# Patient Record
Sex: MALE | Race: OTHER | HISPANIC OR LATINO
[De-identification: names, ages, dates, MRNs, and addresses within clinical notes are randomized per-mention and may not be internally consistent; named-entity substitution may affect disease eponyms.]

---

## 2019-01-24 ENCOUNTER — FORM ENCOUNTER (OUTPATIENT)
Age: 64
End: 2019-01-24

## 2019-01-25 ENCOUNTER — OUTPATIENT (OUTPATIENT)
Dept: OUTPATIENT SERVICES | Facility: HOSPITAL | Age: 64
LOS: 1 days | End: 2019-01-25
Payer: COMMERCIAL

## 2019-01-25 ENCOUNTER — APPOINTMENT (OUTPATIENT)
Dept: ORTHOPEDIC SURGERY | Facility: CLINIC | Age: 64
End: 2019-01-25
Payer: COMMERCIAL

## 2019-01-25 VITALS — BODY MASS INDEX: 35.12 KG/M2 | WEIGHT: 265 LBS | HEIGHT: 73 IN

## 2019-01-25 PROCEDURE — 20610 DRAIN/INJ JOINT/BURSA W/O US: CPT | Mod: RT

## 2019-01-25 PROCEDURE — 73562 X-RAY EXAM OF KNEE 3: CPT

## 2019-01-25 PROCEDURE — 99203 OFFICE O/P NEW LOW 30 MIN: CPT | Mod: 25

## 2019-01-25 PROCEDURE — 73562 X-RAY EXAM OF KNEE 3: CPT | Mod: 26,RT

## 2019-01-25 NOTE — PROCEDURE
[de-identified] : After obtaining verbal consent and under the normal sterile precautions the right knee was injected today via the lateral patellar portal with a solution of 4cc's of 1% lidocaine and 1cc of 40mg/mL of kenalog.

## 2019-01-25 NOTE — ASSESSMENT
[FreeTextEntry1] : Assessment:\par 1. Right knee arthritis severe\par 2. Left knee arthritis moderate\par \par Plan:\par #1 Meloxicam 7.5 mg, 1 tablet PO daily\par #2 Right knee corticosteroid injection \par #3 Needs work note for rest and to return Thursday\par #4 Needs to follow-up in 2 weeks to re-evaluate the pt.'s pain and discuss continued treatment and/or surgical knee replacement options.

## 2019-01-25 NOTE — PHYSICAL EXAM
[de-identified] : Focused exam of bilateral knees:\par \par Right knee: Pt. has no redness, erythema, swelling, or drainage. Has neutral alignment, ROM: -10-90.  Ligamentously Stable. \par \par Left knee: Pt. has no redness, erythema, swelling, or drainage. Has neutral alignment, ROM: 0-100. [de-identified] : Pt. has bilateral medial compartment joint space narrowing, right is worse than left. Right knee is bone on bone. Subchondral cysts and osteophytes.  Pt. has bilateral varus alignment, right is worse than left.

## 2019-03-20 ENCOUNTER — APPOINTMENT (OUTPATIENT)
Dept: ORTHOPEDIC SURGERY | Facility: CLINIC | Age: 64
End: 2019-03-20
Payer: COMMERCIAL

## 2019-03-20 PROCEDURE — 99213 OFFICE O/P EST LOW 20 MIN: CPT

## 2019-03-20 NOTE — ASSESSMENT
[FreeTextEntry1] : Assessment\par #1 severe right knee arthritis\par #2 moderate left knee arthritis\par \par Plan\par #1 explained that a repeat injection would not only be unsafe and less than 90 days but can actually make his arthritis worse, we did not perform this today.\par #2 gave him a 2 week supply of Percocet for pain relief.\par #3 discussed with patient he will eventually need a total knee arthroplasty, explained the risks benefits alternatives thereof, he will go home and talk to his family and think about this over the next 2 weeks and follow up with us in the office.

## 2019-03-20 NOTE — HISTORY OF PRESENT ILLNESS
[de-identified] : 63-year-old male with bone-on-bone arthritis of the medial compartment of the right knee following up today for increasing pain after an injection back in January. His pain is 10/10 worse with weightbearing and ambulatory activities. It is also worse at bedtime. No other aggravating or alleviating factors. He is requesting a repeat injection or some other sort of pain relieving modalities today.

## 2019-03-20 NOTE — PHYSICAL EXAM
[de-identified] : Focused exam of bilateral knees:\par \par Right knee: Pt. has no redness, erythema, swelling, or drainage. Has neutral alignment, ROM: -10-90.  Ligamentously Stable. \par \par Left knee: Pt. has no redness, erythema, swelling, or drainage. Has neutral alignment, ROM: 0-100.

## 2019-04-10 ENCOUNTER — APPOINTMENT (OUTPATIENT)
Dept: ORTHOPEDIC SURGERY | Facility: CLINIC | Age: 64
End: 2019-04-10
Payer: COMMERCIAL

## 2019-04-10 PROCEDURE — 99213 OFFICE O/P EST LOW 20 MIN: CPT

## 2019-04-10 NOTE — ASSESSMENT
[FreeTextEntry1] : Assessment\par #1 severe right knee arthritis\par \par Plan\par #1 is to proceed with a right total knee arthroplasty, he will undergo the normal preoperative clearance pathway and this will be scheduled for the earliest mutual convenience. Risks benefits alternatives explained and clear detail and the patient wishes to proceed. He will get work note for today and tomorrow. We will fill out his [facility] paperwork after he takes a surgical date. No other complaints at this time.

## 2019-04-10 NOTE — PHYSICAL EXAM
[de-identified] : Focused exam of bilateral knees:\par \par Right knee: Pt. has no redness, erythema, swelling, or drainage. Has neutral alignment, ROM: -10-90. Ligamentously Stable. \par \par Left knee: Pt. has no redness, erythema, swelling, or drainage. Has neutral alignment, ROM: 0-100. \par  [de-identified] : X-rays of the right knee show bone-on-bone contact in the medial compartment, there is decreased joint space in the lateral and patellofemoral compartment, there are peripheral osteophytes and subchondral sclerosis and also.

## 2019-04-27 ENCOUNTER — OUTPATIENT (OUTPATIENT)
Dept: OUTPATIENT SERVICES | Facility: HOSPITAL | Age: 64
LOS: 1 days | End: 2019-04-27
Payer: COMMERCIAL

## 2019-04-27 DIAGNOSIS — Z01.818 ENCOUNTER FOR OTHER PREPROCEDURAL EXAMINATION: ICD-10-CM

## 2019-04-27 LAB
ALBUMIN SERPL ELPH-MCNC: 4.1 G/DL — SIGNIFICANT CHANGE UP (ref 3.3–5)
ALP SERPL-CCNC: 82 U/L — SIGNIFICANT CHANGE UP (ref 40–120)
ALT FLD-CCNC: 17 U/L — SIGNIFICANT CHANGE UP (ref 10–45)
ANION GAP SERPL CALC-SCNC: 9 MMOL/L — SIGNIFICANT CHANGE UP (ref 5–17)
APPEARANCE UR: CLEAR — SIGNIFICANT CHANGE UP
APTT BLD: 31.3 SEC — SIGNIFICANT CHANGE UP (ref 27.5–36.3)
AST SERPL-CCNC: 14 U/L — SIGNIFICANT CHANGE UP (ref 10–40)
BACTERIA # UR AUTO: PRESENT /HPF
BILIRUB SERPL-MCNC: 0.4 MG/DL — SIGNIFICANT CHANGE UP (ref 0.2–1.2)
BILIRUB UR-MCNC: NEGATIVE — SIGNIFICANT CHANGE UP
BUN SERPL-MCNC: 19 MG/DL — SIGNIFICANT CHANGE UP (ref 7–23)
CALCIUM SERPL-MCNC: 9.5 MG/DL — SIGNIFICANT CHANGE UP (ref 8.4–10.5)
CHLORIDE SERPL-SCNC: 104 MMOL/L — SIGNIFICANT CHANGE UP (ref 96–108)
CO2 SERPL-SCNC: 31 MMOL/L — SIGNIFICANT CHANGE UP (ref 22–31)
COLOR SPEC: YELLOW — SIGNIFICANT CHANGE UP
CREAT SERPL-MCNC: 1.25 MG/DL — SIGNIFICANT CHANGE UP (ref 0.5–1.3)
DIFF PNL FLD: NEGATIVE — SIGNIFICANT CHANGE UP
EPI CELLS # UR: ABNORMAL /HPF (ref 0–5)
GLUCOSE SERPL-MCNC: 109 MG/DL — HIGH (ref 70–99)
GLUCOSE UR QL: NEGATIVE — SIGNIFICANT CHANGE UP
HCT VFR BLD CALC: 44.7 % — SIGNIFICANT CHANGE UP (ref 39–50)
HGB BLD-MCNC: 14 G/DL — SIGNIFICANT CHANGE UP (ref 13–17)
INR BLD: 1.06 — SIGNIFICANT CHANGE UP (ref 0.88–1.16)
KETONES UR-MCNC: NEGATIVE — SIGNIFICANT CHANGE UP
LEUKOCYTE ESTERASE UR-ACNC: NEGATIVE — SIGNIFICANT CHANGE UP
MCHC RBC-ENTMCNC: 30.2 PG — SIGNIFICANT CHANGE UP (ref 27–34)
MCHC RBC-ENTMCNC: 31.3 GM/DL — LOW (ref 32–36)
MCV RBC AUTO: 96.3 FL — SIGNIFICANT CHANGE UP (ref 80–100)
NITRITE UR-MCNC: NEGATIVE — SIGNIFICANT CHANGE UP
NRBC # BLD: 0 /100 WBCS — SIGNIFICANT CHANGE UP (ref 0–0)
PH UR: 6 — SIGNIFICANT CHANGE UP (ref 5–8)
PLATELET # BLD AUTO: 263 K/UL — SIGNIFICANT CHANGE UP (ref 150–400)
POTASSIUM SERPL-MCNC: 5 MMOL/L — SIGNIFICANT CHANGE UP (ref 3.5–5.3)
POTASSIUM SERPL-SCNC: 5 MMOL/L — SIGNIFICANT CHANGE UP (ref 3.5–5.3)
PROT SERPL-MCNC: 6.7 G/DL — SIGNIFICANT CHANGE UP (ref 6–8.3)
PROT UR-MCNC: ABNORMAL MG/DL
PROTHROM AB SERPL-ACNC: 12 SEC — SIGNIFICANT CHANGE UP (ref 10–12.9)
RBC # BLD: 4.64 M/UL — SIGNIFICANT CHANGE UP (ref 4.2–5.8)
RBC # FLD: 14.8 % — HIGH (ref 10.3–14.5)
RBC CASTS # UR COMP ASSIST: < 5 /HPF — SIGNIFICANT CHANGE UP
SODIUM SERPL-SCNC: 144 MMOL/L — SIGNIFICANT CHANGE UP (ref 135–145)
SP GR SPEC: 1.01 — SIGNIFICANT CHANGE UP (ref 1–1.03)
UROBILINOGEN FLD QL: 0.2 E.U./DL — SIGNIFICANT CHANGE UP
WBC # BLD: 5.39 K/UL — SIGNIFICANT CHANGE UP (ref 3.8–10.5)
WBC # FLD AUTO: 5.39 K/UL — SIGNIFICANT CHANGE UP (ref 3.8–10.5)
WBC UR QL: < 5 /HPF — SIGNIFICANT CHANGE UP

## 2019-04-27 PROCEDURE — 93010 ELECTROCARDIOGRAM REPORT: CPT

## 2019-04-27 PROCEDURE — 85730 THROMBOPLASTIN TIME PARTIAL: CPT

## 2019-04-27 PROCEDURE — 85610 PROTHROMBIN TIME: CPT

## 2019-04-27 PROCEDURE — 85027 COMPLETE CBC AUTOMATED: CPT

## 2019-04-27 PROCEDURE — 80053 COMPREHEN METABOLIC PANEL: CPT

## 2019-04-27 PROCEDURE — 81001 URINALYSIS AUTO W/SCOPE: CPT

## 2019-04-27 PROCEDURE — 93005 ELECTROCARDIOGRAM TRACING: CPT

## 2019-05-07 ENCOUNTER — FORM ENCOUNTER (OUTPATIENT)
Age: 64
End: 2019-05-07

## 2019-05-08 ENCOUNTER — OUTPATIENT (OUTPATIENT)
Dept: OUTPATIENT SERVICES | Facility: HOSPITAL | Age: 64
LOS: 1 days | End: 2019-05-08
Payer: COMMERCIAL

## 2019-05-08 PROCEDURE — 71046 X-RAY EXAM CHEST 2 VIEWS: CPT

## 2019-05-08 PROCEDURE — 71046 X-RAY EXAM CHEST 2 VIEWS: CPT | Mod: 26

## 2019-05-10 ENCOUNTER — APPOINTMENT (OUTPATIENT)
Dept: HEART AND VASCULAR | Facility: CLINIC | Age: 64
End: 2019-05-10
Payer: COMMERCIAL

## 2019-05-10 ENCOUNTER — APPOINTMENT (OUTPATIENT)
Dept: HEART AND VASCULAR | Facility: CLINIC | Age: 64
End: 2019-05-10

## 2019-05-10 VITALS
SYSTOLIC BLOOD PRESSURE: 170 MMHG | OXYGEN SATURATION: 98 % | BODY MASS INDEX: 34.85 KG/M2 | DIASTOLIC BLOOD PRESSURE: 80 MMHG | WEIGHT: 263 LBS | RESPIRATION RATE: 16 BRPM | HEIGHT: 73 IN | TEMPERATURE: 98.3 F | HEART RATE: 76 BPM

## 2019-05-10 PROCEDURE — 93306 TTE W/DOPPLER COMPLETE: CPT

## 2019-05-10 PROCEDURE — 99205 OFFICE O/P NEW HI 60 MIN: CPT | Mod: 25

## 2019-05-10 PROCEDURE — 93880 EXTRACRANIAL BILAT STUDY: CPT

## 2019-05-16 ENCOUNTER — APPOINTMENT (OUTPATIENT)
Dept: ORTHOPEDIC SURGERY | Facility: HOSPITAL | Age: 64
End: 2019-05-16

## 2019-05-16 NOTE — DISCUSSION/SUMMARY
[Procedure Low Risk] : the procedure risk is low [Patient High Risk] : the patient is a high surgical risk [Additional Diagnostics Recommended] : additional diagnostics recommended [FreeTextEntry1] : echocardiogram shows concentric LVH with reduced LVEF  45% and moderate MR  with secondary pulmonary hypertension \par \par Will start amlodipine/benazepril 10/20 po qd     and Furosemide 40mg  three days a week\par follow up in one week  to assess response to therapy-   will need  pharmacologic nuclear stress to r/o significant obstructive CAD prior to  general surgery

## 2019-05-16 NOTE — PHYSICAL EXAM
[General Appearance - Well Developed] : well developed [Well Groomed] : well groomed [Normal Appearance] : normal appearance [No Deformities] : no deformities [General Appearance - In No Acute Distress] : no acute distress [Normal Conjunctiva] : the conjunctiva exhibited no abnormalities [No Oral Cyanosis] : no oral cyanosis [Eyelids - No Xanthelasma] : the eyelids demonstrated no xanthelasmas [Normal Jugular Venous V Waves Present] : normal jugular venous V waves present [Normal Jugular Venous A Waves Present] : normal jugular venous A waves present [No Jugular Venous Bowie A Waves] : no jugular venous bowie A waves [Exaggerated Use Of Accessory Muscles For Inspiration] : no accessory muscle use [Respiration, Rhythm And Depth] : normal respiratory rhythm and effort [Heart Sounds] : normal S1 and S2 [Auscultation Breath Sounds / Voice Sounds] : lungs were clear to auscultation bilaterally [Heart Rate And Rhythm] : heart rate and rhythm were normal [Murmurs] : no murmurs present [FreeTextEntry1] : limping  secondary to right knee pain  [Nail Clubbing] : no clubbing of the fingernails [Cyanosis, Localized] : no localized cyanosis [Petechial Hemorrhages (___cm)] : no petechial hemorrhages [Skin Color & Pigmentation] : normal skin color and pigmentation [Skin Turgor] : normal skin turgor [No Venous Stasis] : no venous stasis [Skin Lesions] : no skin lesions [] : no rash [No Skin Ulcers] : no skin ulcer [No Xanthoma] : no  xanthoma was observed [Mood] : the mood was normal [Affect] : the affect was normal [Oriented To Time, Place, And Person] : oriented to person, place, and time

## 2019-05-16 NOTE — HISTORY OF PRESENT ILLNESS
[Preoperative Visit] : for a medical evaluation prior to surgery [Scheduled Procedure ___] : a [unfilled] [Date of Surgery ___] : on [unfilled] [Surgeon Name ___] : surgeon: [unfilled] [Fever] : no fever [Stable] : Stable [Chills] : no chills [Chest Pain] : no chest pain [Fatigue] : no fatigue [Dyspnea] : dyspnea [Cough] : no cough [Nausea] : no nausea [Dysuria] : no dysuria [Urinary Frequency] : no urinary frequency [Abdominal Pain] : no abdominal pain [Diarrhea] : no diarrhea [Vomiting] : no vomiting [Lower Extremity Swelling] : lower extremity swelling [Easy Bruising] : no easy bruising [Diabetes] : no diabetes [Poor Exercise Tolerance] : poor exercise tolerance [Cardiovascular Disease] : cardiovascular disease [Dyspnea on Exertion] : difficulty breathing during exertion [Pulmonary Disease] : no pulmonary disease [Anti-Platelet Agents] : no anti-platelet agents [Nicotine Dependence] : no nicotine dependence [Alcohol Use] : no  alcohol use [Sleep Apnea] : no sleep apnea [Renal Disease] : no renal disease [GI Disease] : no gastrointestinal disease [Thromboembolic Problems] : no thromboembolic problems [Frequent use of NSAIDs] : no use of NSAIDs [Steroid Use in Last 6 Months] : no steroid use in the last six months [Transfusion Reaction] : no transfusion reaction [Impaired Immunity] : no impaired immunity [Prev Anesthesia Reaction] : no previous anesthesia reaction [Frequent Aspirin Use] : no frequent aspirin use [Prior Anesthesia] : No prior anesthesia [Anesthesia Reaction] : no anesthesia reaction [Sudden Death] : no sudden death [Unable to Assess] : Unable to Assess [Bleeding Disorder] : no bleeding disorder [Clotting Disorder] : no clotting disorder [FreeTextEntry1] : 64 year old male  with DJD of right knee  and pain with difficulty ambulating presents for preop clearance prior to planned  right total knee replacement.  His preop EKG shows  NSR  with atrial abnormality and LVH with diffuse T wave changes  and prolonged QTc.  He is very dyspneic on exertion and preop CXR  shows severe cardiomegaly  with mild CHF.  \par \par He denies hx of MI, syncope, angina   but has lower extremity edema, dyspnea on exertion and 2 pillow orthopnea \par \par Never had stress testing , MI, asthma, CHF or stroke/TIA syndrome \par \par \par

## 2019-05-16 NOTE — ASSESSMENT
[FreeTextEntry1] : cardiomegaly with mild CHF - hypertensive cardiomyopathy \par \par BMI  35 \par \par Moderate mitral regurgitation \par \par \par can not exclude significant CAD \par \par \par \par

## 2019-05-16 NOTE — REVIEW OF SYSTEMS
[Feeling Fatigued] : feeling fatigued [Dyspnea on exertion] : dyspnea during exertion [Joint Pain] : joint pain [Joint Swelling] : no joint swelling [Limb Weakness (Paresis)] : limb weakness [Joint Stiffness] : joint stiffness [Muscle Cramps] : no muscle cramps [Negative] : Heme/Lymph

## 2019-06-07 ENCOUNTER — APPOINTMENT (OUTPATIENT)
Dept: HEART AND VASCULAR | Facility: CLINIC | Age: 64
End: 2019-06-07
Payer: COMMERCIAL

## 2019-06-07 VITALS
DIASTOLIC BLOOD PRESSURE: 98 MMHG | SYSTOLIC BLOOD PRESSURE: 160 MMHG | WEIGHT: 254 LBS | OXYGEN SATURATION: 98 % | HEART RATE: 89 BPM | TEMPERATURE: 98.3 F | HEIGHT: 73 IN | BODY MASS INDEX: 33.66 KG/M2 | RESPIRATION RATE: 16 BRPM

## 2019-06-07 DIAGNOSIS — I44.0 ATRIOVENTRICULAR BLOCK, FIRST DEGREE: ICD-10-CM

## 2019-06-07 DIAGNOSIS — F10.10 ALCOHOL ABUSE, UNCOMPLICATED: ICD-10-CM

## 2019-06-07 DIAGNOSIS — R94.31 ABNORMAL ELECTROCARDIOGRAM [ECG] [EKG]: ICD-10-CM

## 2019-06-07 DIAGNOSIS — I34.0 NONRHEUMATIC MITRAL (VALVE) INSUFFICIENCY: ICD-10-CM

## 2019-06-07 PROCEDURE — 36415 COLL VENOUS BLD VENIPUNCTURE: CPT

## 2019-06-07 PROCEDURE — 99214 OFFICE O/P EST MOD 30 MIN: CPT

## 2019-06-08 LAB
ALBUMIN SERPL ELPH-MCNC: 4.4 G/DL
ALP BLD-CCNC: 83 U/L
ALT SERPL-CCNC: 9 U/L
ANION GAP SERPL CALC-SCNC: 17 MMOL/L
AST SERPL-CCNC: 10 U/L
BASOPHILS # BLD AUTO: 0.01 K/UL
BASOPHILS NFR BLD AUTO: 0.2 %
BILIRUB SERPL-MCNC: 0.3 MG/DL
BUN SERPL-MCNC: 25 MG/DL
CALCIUM SERPL-MCNC: 9.9 MG/DL
CHLORIDE SERPL-SCNC: 104 MMOL/L
CHOLEST SERPL-MCNC: 209 MG/DL
CHOLEST/HDLC SERPL: 3.3 RATIO
CO2 SERPL-SCNC: 24 MMOL/L
CREAT SERPL-MCNC: 1.34 MG/DL
EOSINOPHIL # BLD AUTO: 0.09 K/UL
EOSINOPHIL NFR BLD AUTO: 1.6 %
ESTIMATED AVERAGE GLUCOSE: 134 MG/DL
HBA1C MFR BLD HPLC: 6.3 %
HCT VFR BLD CALC: 46.7 %
HDLC SERPL-MCNC: 64 MG/DL
HGB BLD-MCNC: 14.1 G/DL
IMM GRANULOCYTES NFR BLD AUTO: 0.2 %
LDLC SERPL CALC-MCNC: 133 MG/DL
LYMPHOCYTES # BLD AUTO: 1.44 K/UL
LYMPHOCYTES NFR BLD AUTO: 25 %
MAGNESIUM SERPL-MCNC: 1.9 MG/DL
MAN DIFF?: NORMAL
MCHC RBC-ENTMCNC: 28.9 PG
MCHC RBC-ENTMCNC: 30.2 GM/DL
MCV RBC AUTO: 95.7 FL
MONOCYTES # BLD AUTO: 0.51 K/UL
MONOCYTES NFR BLD AUTO: 8.9 %
NEUTROPHILS # BLD AUTO: 3.69 K/UL
NEUTROPHILS NFR BLD AUTO: 64.1 %
NT-PROBNP SERPL-MCNC: 164 PG/ML
PLATELET # BLD AUTO: 272 K/UL
POTASSIUM SERPL-SCNC: 4.5 MMOL/L
PROT SERPL-MCNC: 7.2 G/DL
RBC # BLD: 4.88 M/UL
RBC # FLD: 14.8 %
SODIUM SERPL-SCNC: 145 MMOL/L
TRIGL SERPL-MCNC: 62 MG/DL
WBC # FLD AUTO: 5.75 K/UL

## 2019-06-12 PROBLEM — I34.0 MITRAL REGURGITATION: Status: ACTIVE | Noted: 2019-05-16

## 2019-06-12 PROBLEM — F10.10 ALCOHOL ABUSE: Status: RESOLVED | Noted: 2019-06-12 | Resolved: 2019-06-12

## 2019-06-12 PROBLEM — I44.0 1ST DEGREE AV BLOCK: Status: ACTIVE | Noted: 2019-05-16

## 2019-06-12 NOTE — REASON FOR VISIT
[Follow-Up - Clinic] : a clinic follow-up of [Abnormal ECG] : an abnormal ECG [Dyspnea] : dyspnea [Medication Management] : Medication management [Hypertension] : hypertension [Heart Failure] : congestive heart failure [FreeTextEntry1] : 64 year old male with  recently diagnosed  hypertensive cardiomyopathy and CHF  presents for follow up after initiation of  medical therapy.  [Mitral Regurgitation] : mitral regurgitation

## 2019-06-12 NOTE — REVIEW OF SYSTEMS
[Recent Weight Loss (___ Lbs)] : recent [unfilled] ~Ulb weight loss [Feeling Fatigued] : feeling fatigued [Joint Pain] : joint pain [Dyspnea on exertion] : dyspnea during exertion [Lower Ext Edema] : lower extremity edema [Muscle Cramps] : no muscle cramps [Joint Swelling] : no joint swelling [Joint Stiffness] : joint stiffness [Limb Weakness (Paresis)] : limb weakness [Negative] : Heme/Lymph

## 2019-06-12 NOTE — DISCUSSION/SUMMARY
[Cardiomyopathy] : cardiomyopathy [Systolic Heart Failure] : systolic heart failure [Stable] : stable [Moderate Mitral Regurgitation] : moderate mitral regurgitation [Compensated] : compensated [___ Week(s)] : [unfilled] week(s) [With Me] : with me [de-identified] : reinforced furosemide 40mg qod , start carvedilol  3.125mg po bid , increase dose of amlodipine /benazepril to 10/40mg po qd  [de-identified] : Increase afterload reduction  [FreeTextEntry1] : Increase furosemide to 40mg po qod\par \par start carvedilol 3.125mg po bid\par \par increase dose of amlodipine /benazepril 10/40mg po qd\par \par venipuncture today with BNP,  creatinine, Hgba1c  , Magnesium, etc \par \par stay away from NSAIDs and alcohol \par \par Voltaren cream to right knee  bid  for pain management  with icing in between \par \par All instructions written and verbally reviewed  and patient assess for understanding

## 2019-06-12 NOTE — PHYSICAL EXAM
[General Appearance - Well Developed] : well developed [Normal Appearance] : normal appearance [Well Groomed] : well groomed [No Deformities] : no deformities [General Appearance - In No Acute Distress] : no acute distress [Normal Conjunctiva] : the conjunctiva exhibited no abnormalities [Eyelids - No Xanthelasma] : the eyelids demonstrated no xanthelasmas [No Oral Cyanosis] : no oral cyanosis [Normal Jugular Venous A Waves Present] : normal jugular venous A waves present [Normal Jugular Venous V Waves Present] : normal jugular venous V waves present [No Jugular Venous Bowie A Waves] : no jugular venous bowie A waves [Exaggerated Use Of Accessory Muscles For Inspiration] : no accessory muscle use [Respiration, Rhythm And Depth] : normal respiratory rhythm and effort [Auscultation Breath Sounds / Voice Sounds] : lungs were clear to auscultation bilaterally [Heart Rate And Rhythm] : heart rate and rhythm were normal [Heart Sounds] : normal S1 and S2 [Murmurs] : no murmurs present [FreeTextEntry1] : limping  secondary to right knee pain  [Nail Clubbing] : no clubbing of the fingernails [Cyanosis, Localized] : no localized cyanosis [Petechial Hemorrhages (___cm)] : no petechial hemorrhages [Skin Color & Pigmentation] : normal skin color and pigmentation [] : no rash [Skin Turgor] : normal skin turgor [No Venous Stasis] : no venous stasis [No Skin Ulcers] : no skin ulcer [Skin Lesions] : no skin lesions [No Xanthoma] : no  xanthoma was observed [Oriented To Time, Place, And Person] : oriented to person, place, and time [Mood] : the mood was normal [Affect] : the affect was normal 04-Oct-2017 06:12

## 2019-06-12 NOTE — HISTORY OF PRESENT ILLNESS
[FreeTextEntry1] : Prior EKG showed NSR  94 bpm with LVH  and strain and atrial enlargement \par \par Patient reports  that he is feeling better already with  prescribed medications : much less short of breath  and edema is improving \par \par he admits variable compliance with diuretic  because it has him urinating "all the time" . No dizziness , syncope \par \par Baseline creatinine 1.25 mg/dL \par \par He reports that he has markedly reduced alcohol intake

## 2019-06-27 ENCOUNTER — APPOINTMENT (OUTPATIENT)
Dept: HEART AND VASCULAR | Facility: CLINIC | Age: 64
End: 2019-06-27
Payer: COMMERCIAL

## 2019-06-27 VITALS — WEIGHT: 249 LBS | BODY MASS INDEX: 33 KG/M2 | HEIGHT: 73 IN

## 2019-06-27 VITALS
RESPIRATION RATE: 16 BRPM | DIASTOLIC BLOOD PRESSURE: 100 MMHG | OXYGEN SATURATION: 98 % | HEART RATE: 72 BPM | TEMPERATURE: 98.8 F | SYSTOLIC BLOOD PRESSURE: 150 MMHG

## 2019-06-27 VITALS — WEIGHT: 259 LBS | BODY MASS INDEX: 34.17 KG/M2

## 2019-06-27 PROCEDURE — 99214 OFFICE O/P EST MOD 30 MIN: CPT

## 2019-07-04 NOTE — HISTORY OF PRESENT ILLNESS
[FreeTextEntry1] : Pharmacy did not fill his prescription for furosemide but he has been taking his other medications\par \par He continues to have marked edema  and fatigue\par \par \par he denies syncope, palpitations , dry cough \par \par His dyspnea has improved somewhat

## 2019-07-04 NOTE — REVIEW OF SYSTEMS
[Recent Weight Gain (___ Lbs)] : recent [unfilled] ~Ulb weight gain [Feeling Fatigued] : feeling fatigued [Recent Weight Loss (___ Lbs)] : no recent weight loss [Dyspnea on exertion] : dyspnea during exertion [Lower Ext Edema] : lower extremity edema [Joint Pain] : joint pain [Joint Swelling] : no joint swelling [Joint Stiffness] : joint stiffness [Muscle Cramps] : no muscle cramps [Limb Weakness (Paresis)] : limb weakness [Negative] : Heme/Lymph

## 2019-07-04 NOTE — PHYSICAL EXAM
[General Appearance - Well Developed] : well developed [Well Groomed] : well groomed [No Deformities] : no deformities [Normal Appearance] : normal appearance [General Appearance - In No Acute Distress] : no acute distress [Eyelids - No Xanthelasma] : the eyelids demonstrated no xanthelasmas [Normal Conjunctiva] : the conjunctiva exhibited no abnormalities [No Oral Cyanosis] : no oral cyanosis [Normal Jugular Venous A Waves Present] : normal jugular venous A waves present [Normal Jugular Venous V Waves Present] : normal jugular venous V waves present [No Jugular Venous Bowie A Waves] : no jugular venous bowie A waves [Exaggerated Use Of Accessory Muscles For Inspiration] : no accessory muscle use [Respiration, Rhythm And Depth] : normal respiratory rhythm and effort [Auscultation Breath Sounds / Voice Sounds] : lungs were clear to auscultation bilaterally [Murmurs] : no murmurs present [Heart Sounds] : normal S1 and S2 [Heart Rate And Rhythm] : heart rate and rhythm were normal [Nail Clubbing] : no clubbing of the fingernails [FreeTextEntry1] : 2+ edema lower extremities  [Cyanosis, Localized] : no localized cyanosis [Petechial Hemorrhages (___cm)] : no petechial hemorrhages [Skin Turgor] : normal skin turgor [] : no rash [Skin Color & Pigmentation] : normal skin color and pigmentation [No Skin Ulcers] : no skin ulcer [No Xanthoma] : no  xanthoma was observed [Skin Lesions] : no skin lesions [No Venous Stasis] : no venous stasis [Oriented To Time, Place, And Person] : oriented to person, place, and time [Mood] : the mood was normal [Affect] : the affect was normal

## 2019-07-04 NOTE — REASON FOR VISIT
[Follow-Up - Clinic] : a clinic follow-up of [Abnormal ECG] : an abnormal ECG [Cardiomyopathy] : cardiomyopathy [Dyspnea] : dyspnea [Heart Failure] : congestive heart failure [Hypertension] : hypertension [FreeTextEntry1] : 64 year old male with  recently diagnosed  hypertensive cardiomyopathy and CHF  presents for follow up after initiation of  medical therapy.

## 2019-07-04 NOTE — DISCUSSION/SUMMARY
[Stable] : stable [Systolic Heart Failure] : systolic congestive heart failure [Sodium Restriction] : sodium restriction [Essential Hypertension] : essential hypertension [Improving] : improving [___ Week(s)] : [unfilled] week(s) [With Me] : with me [FreeTextEntry1] : Instructions written out and pharmacy called to dispense Furosemide 40mg po qd \par \par Stay home from work one week\par \par Follow up two weeks to reassess weight and BP \par \par Titrate medications as tolerated , next visit may titrate carvedilol to 6.25mg po bid  [de-identified] : Resume furosemide 40mg po qd

## 2019-07-12 ENCOUNTER — APPOINTMENT (OUTPATIENT)
Dept: HEART AND VASCULAR | Facility: CLINIC | Age: 64
End: 2019-07-12
Payer: COMMERCIAL

## 2019-07-12 VITALS
WEIGHT: 256 LBS | TEMPERATURE: 98.8 F | HEART RATE: 77 BPM | DIASTOLIC BLOOD PRESSURE: 100 MMHG | RESPIRATION RATE: 16 BRPM | BODY MASS INDEX: 33.93 KG/M2 | SYSTOLIC BLOOD PRESSURE: 154 MMHG | OXYGEN SATURATION: 98 % | HEIGHT: 73 IN

## 2019-07-12 DIAGNOSIS — Z00.00 ENCOUNTER FOR GENERAL ADULT MEDICAL EXAMINATION W/OUT ABNORMAL FINDINGS: ICD-10-CM

## 2019-07-12 PROCEDURE — 36415 COLL VENOUS BLD VENIPUNCTURE: CPT

## 2019-07-12 PROCEDURE — 99214 OFFICE O/P EST MOD 30 MIN: CPT

## 2019-07-12 RX ORDER — AMLODIPINE BESYLATE AND BENAZEPRIL HYDROCHLORIDE 10; 40 MG/1; MG/1
10-40 CAPSULE ORAL
Qty: 90 | Refills: 1 | Status: DISCONTINUED | COMMUNITY
Start: 2019-05-10 | End: 2019-07-12

## 2019-07-13 LAB
ALBUMIN SERPL ELPH-MCNC: 4 G/DL
ALP BLD-CCNC: 78 U/L
ALT SERPL-CCNC: 8 U/L
ANION GAP SERPL CALC-SCNC: 13 MMOL/L
AST SERPL-CCNC: 8 U/L
BILIRUB SERPL-MCNC: 0.4 MG/DL
BUN SERPL-MCNC: 19 MG/DL
CALCIUM SERPL-MCNC: 9.7 MG/DL
CHLORIDE SERPL-SCNC: 102 MMOL/L
CO2 SERPL-SCNC: 25 MMOL/L
CREAT SERPL-MCNC: 1.21 MG/DL
MAGNESIUM SERPL-MCNC: 1.8 MG/DL
POTASSIUM SERPL-SCNC: 4.5 MMOL/L
PROT SERPL-MCNC: 6.8 G/DL
SODIUM SERPL-SCNC: 140 MMOL/L

## 2019-07-16 PROBLEM — Z00.00 ENCOUNTER FOR PREVENTIVE HEALTH EXAMINATION: Status: ACTIVE | Noted: 2019-01-24

## 2019-07-16 NOTE — ASSESSMENT
[FreeTextEntry1] : Lower extremity edema may be worsening secondary to amlodipine\par \par hypertensive cardiomyopathy  with suboptimal BP control

## 2019-07-16 NOTE — REASON FOR VISIT
[FreeTextEntry1] : 64 year old male with  recently diagnosed  hypertensive cardiomyopathy and CHF  presents for follow up after titration of  medical therapy.

## 2019-07-16 NOTE — HISTORY OF PRESENT ILLNESS
[FreeTextEntry1] : Reports compliance with medications : he is breathing much better but continues to have edema despite daily use of furosemide \par \par No chest pain, dizziness , syncope \par \par chronic right knee pain  helped by use of topical diclofenac

## 2019-07-16 NOTE — REASON FOR VISIT
[Follow-Up Visit] : a follow-up visit for [Knee Pain] : knee pain [Follow-Up - Clinic] : a clinic follow-up of [Heart Failure] : congestive heart failure [Hypertension] : hypertension

## 2019-07-16 NOTE — REVIEW OF SYSTEMS
[Feeling Fatigued] : feeling fatigued [Recent Weight Loss (___ Lbs)] : recent [unfilled] ~Ulb weight loss [Dyspnea on exertion] : dyspnea during exertion [Lower Ext Edema] : lower extremity edema [Joint Pain] : joint pain [Joint Stiffness] : joint stiffness [Limb Weakness (Paresis)] : limb weakness [Negative] : Heme/Lymph [Recent Weight Gain (___ Lbs)] : no recent weight gain [Joint Swelling] : no joint swelling [Muscle Cramps] : no muscle cramps

## 2019-07-16 NOTE — DISCUSSION/SUMMARY
[Combined Systolic and Diastolic Heart Failure] : combined systolic and diastolic congestive heart failure [Decompensated] : decompensated [Sodium Restriction] : sodium restriction [Essential Hypertension] : essential hypertension [Not Responding to Treatment] : not responding to treatment [___ Week(s)] : [unfilled] week(s) [With Me] : with me [de-identified] : stop amlodipine  [FreeTextEntry1] : start Entresto  bid \par \par Continue Furosemide 40mg po qd \par \par Continue carvedilol 3.125mg po bid\par \par venipuncture performed \par \par Written instructions provided  with verbal explanation \par \par

## 2019-07-16 NOTE — PHYSICAL EXAM
[General Appearance - Well Developed] : well developed [Normal Appearance] : normal appearance [Well Groomed] : well groomed [No Deformities] : no deformities [General Appearance - In No Acute Distress] : no acute distress [Normal Conjunctiva] : the conjunctiva exhibited no abnormalities [Eyelids - No Xanthelasma] : the eyelids demonstrated no xanthelasmas [No Oral Cyanosis] : no oral cyanosis [Normal Jugular Venous A Waves Present] : normal jugular venous A waves present [Normal Jugular Venous V Waves Present] : normal jugular venous V waves present [No Jugular Venous Bowie A Waves] : no jugular venous bowie A waves [Respiration, Rhythm And Depth] : normal respiratory rhythm and effort [Exaggerated Use Of Accessory Muscles For Inspiration] : no accessory muscle use [Auscultation Breath Sounds / Voice Sounds] : lungs were clear to auscultation bilaterally [Heart Rate And Rhythm] : heart rate and rhythm were normal [Heart Sounds] : normal S1 and S2 [Murmurs] : no murmurs present [Nail Clubbing] : no clubbing of the fingernails [Cyanosis, Localized] : no localized cyanosis [Petechial Hemorrhages (___cm)] : no petechial hemorrhages [Skin Color & Pigmentation] : normal skin color and pigmentation [Skin Turgor] : normal skin turgor [] : no rash [No Venous Stasis] : no venous stasis [Skin Lesions] : no skin lesions [No Skin Ulcers] : no skin ulcer [No Xanthoma] : no  xanthoma was observed [Oriented To Time, Place, And Person] : oriented to person, place, and time [Affect] : the affect was normal [Mood] : the mood was normal [FreeTextEntry1] : limping  secondary to right knee pain

## 2019-07-26 ENCOUNTER — APPOINTMENT (OUTPATIENT)
Dept: HEART AND VASCULAR | Facility: CLINIC | Age: 64
End: 2019-07-26
Payer: COMMERCIAL

## 2019-07-26 VITALS
SYSTOLIC BLOOD PRESSURE: 160 MMHG | WEIGHT: 258 LBS | OXYGEN SATURATION: 99 % | HEART RATE: 66 BPM | DIASTOLIC BLOOD PRESSURE: 102 MMHG | HEIGHT: 73 IN | TEMPERATURE: 98.5 F | BODY MASS INDEX: 34.19 KG/M2

## 2019-07-26 PROCEDURE — 99214 OFFICE O/P EST MOD 30 MIN: CPT

## 2019-07-26 NOTE — PHYSICAL EXAM
[Normal Appearance] : normal appearance [General Appearance - Well Developed] : well developed [Well Groomed] : well groomed [No Deformities] : no deformities [General Appearance - In No Acute Distress] : no acute distress [Normal Conjunctiva] : the conjunctiva exhibited no abnormalities [Eyelids - No Xanthelasma] : the eyelids demonstrated no xanthelasmas [No Oral Cyanosis] : no oral cyanosis [Normal Jugular Venous A Waves Present] : normal jugular venous A waves present [Normal Jugular Venous V Waves Present] : normal jugular venous V waves present [No Jugular Venous Bowie A Waves] : no jugular venous bowie A waves [Respiration, Rhythm And Depth] : normal respiratory rhythm and effort [Exaggerated Use Of Accessory Muscles For Inspiration] : no accessory muscle use [Auscultation Breath Sounds / Voice Sounds] : lungs were clear to auscultation bilaterally [Heart Rate And Rhythm] : heart rate and rhythm were normal [Heart Sounds] : normal S1 and S2 [Murmurs] : no murmurs present [Nail Clubbing] : no clubbing of the fingernails [Cyanosis, Localized] : no localized cyanosis [Petechial Hemorrhages (___cm)] : no petechial hemorrhages [Skin Color & Pigmentation] : normal skin color and pigmentation [Skin Turgor] : normal skin turgor [] : no rash [No Venous Stasis] : no venous stasis [Skin Lesions] : no skin lesions [No Skin Ulcers] : no skin ulcer [No Xanthoma] : no  xanthoma was observed [Affect] : the affect was normal [Oriented To Time, Place, And Person] : oriented to person, place, and time [Mood] : the mood was normal [FreeTextEntry1] : 3+ edema lower extremities

## 2019-07-26 NOTE — REVIEW OF SYSTEMS
[Recent Weight Gain (___ Lbs)] : recent [unfilled] ~Ulb weight gain [Feeling Fatigued] : feeling fatigued [Dyspnea on exertion] : dyspnea during exertion [Lower Ext Edema] : lower extremity edema [Joint Pain] : joint pain [Joint Stiffness] : joint stiffness [Limb Weakness (Paresis)] : limb weakness [Negative] : Heme/Lymph [Recent Weight Loss (___ Lbs)] : no recent weight loss [Joint Swelling] : no joint swelling [Muscle Cramps] : no muscle cramps

## 2019-07-26 NOTE — DISCUSSION/SUMMARY
[FreeTextEntry1] : Start Bidil bid \par \par Increase furosemide  to 40 mg po bid \par \par Reduce sodium intake

## 2019-07-26 NOTE — REASON FOR VISIT
[Follow-Up - Clinic] : a clinic follow-up of [Cardiomyopathy] : cardiomyopathy [Dyspnea] : dyspnea [Heart Failure] : congestive heart failure [Hypertension] : hypertension [Medication Management] : Medication management [Mitral Regurgitation] : mitral regurgitation [FreeTextEntry1] : 64 year old male with  recently diagnosed  hypertensive cardiomyopathy and CHF  presents for follow up after titration of  medical therapy.

## 2019-07-26 NOTE — HISTORY OF PRESENT ILLNESS
[FreeTextEntry1] : Switched medication to Entresto \par \par Tolerating medication\par \par Denies cough, rashes , palpitations , dizziness, chest pain\par \par shoes fitting better

## 2019-08-15 ENCOUNTER — APPOINTMENT (OUTPATIENT)
Dept: HEART AND VASCULAR | Facility: CLINIC | Age: 64
End: 2019-08-15
Payer: COMMERCIAL

## 2019-08-15 VITALS
BODY MASS INDEX: 34.3 KG/M2 | SYSTOLIC BLOOD PRESSURE: 180 MMHG | RESPIRATION RATE: 14 BRPM | DIASTOLIC BLOOD PRESSURE: 100 MMHG | HEART RATE: 61 BPM | OXYGEN SATURATION: 98 % | WEIGHT: 260 LBS | TEMPERATURE: 98.1 F

## 2019-08-15 DIAGNOSIS — F41.9 ANXIETY DISORDER, UNSPECIFIED: ICD-10-CM

## 2019-08-15 DIAGNOSIS — F32.9 ANXIETY DISORDER, UNSPECIFIED: ICD-10-CM

## 2019-08-15 PROCEDURE — 99214 OFFICE O/P EST MOD 30 MIN: CPT

## 2019-08-15 RX ORDER — HYDRALAZINE HYDROCHLORIDE AND ISOSORBIDE DINITRATE 37.5; 2 MG/1; MG/1
20-37.5 TABLET, FILM COATED ORAL
Qty: 180 | Refills: 1 | Status: DISCONTINUED | COMMUNITY
Start: 2019-07-26 | End: 2019-08-15

## 2019-08-15 NOTE — HISTORY OF PRESENT ILLNESS
[FreeTextEntry1] : Ran out of Entresto on Monday\par \par Denies chest pain, syncope\par \par \par Reports breathing is much better \par \par \par Feels depressed and would like  to start  antidepressant medication \par

## 2019-08-15 NOTE — DISCUSSION/SUMMARY
[Essential Hypertension] : essential hypertension [Improving] : improving [Weight Loss] : weight loss [None] : none [Sodium Restriction] : sodium restriction [___ Month(s)] : [unfilled] month(s) [With Me] : with me [FreeTextEntry1] : Renew Entresto\par \par start  escitalopram 10mg po qhs \par \par reinforce low sodium\par \par continue diclofenac cream and tylenol for  knee pain \par \par Plan repeating echocardiogram in October

## 2019-08-15 NOTE — REVIEW OF SYSTEMS
[Dyspnea on exertion] : dyspnea during exertion [Lower Ext Edema] : lower extremity edema [Joint Pain] : joint pain [Joint Stiffness] : joint stiffness [Limb Weakness (Paresis)] : limb weakness [Depression] : depression [Anxiety] : anxiety [Negative] : Heme/Lymph [Recent Weight Gain (___ Lbs)] : no recent weight gain [Feeling Fatigued] : not feeling fatigued [Recent Weight Loss (___ Lbs)] : no recent weight loss [Joint Swelling] : no joint swelling [Muscle Cramps] : no muscle cramps [Confusion] : no confusion was observed [Memory Lapses Or Loss] : no memory lapses or loss [Under Stress] : not under stress [Suicidal] : not suicidal

## 2019-08-15 NOTE — REASON FOR VISIT
[Follow-Up - Clinic] : a clinic follow-up of [Abnormal ECG] : an abnormal ECG [Heart Failure] : congestive heart failure [Hypertension] : hypertension [Medication Management] : Medication management [FreeTextEntry1] : 64 year old male with  recently diagnosed  hypertensive cardiomyopathy and CHF  presents for follow up after titration of  medical therapy.

## 2019-08-15 NOTE — PHYSICAL EXAM
[General Appearance - Well Developed] : well developed [Normal Appearance] : normal appearance [Well Groomed] : well groomed [No Deformities] : no deformities [General Appearance - In No Acute Distress] : no acute distress [Normal Conjunctiva] : the conjunctiva exhibited no abnormalities [Eyelids - No Xanthelasma] : the eyelids demonstrated no xanthelasmas [No Oral Cyanosis] : no oral cyanosis [Normal Jugular Venous A Waves Present] : normal jugular venous A waves present [Normal Jugular Venous V Waves Present] : normal jugular venous V waves present [No Jugular Venous Bowie A Waves] : no jugular venous bowie A waves [Respiration, Rhythm And Depth] : normal respiratory rhythm and effort [Exaggerated Use Of Accessory Muscles For Inspiration] : no accessory muscle use [Auscultation Breath Sounds / Voice Sounds] : lungs were clear to auscultation bilaterally [Heart Rate And Rhythm] : heart rate and rhythm were normal [Heart Sounds] : normal S1 and S2 [Murmurs] : no murmurs present [Cyanosis, Localized] : no localized cyanosis [Nail Clubbing] : no clubbing of the fingernails [Petechial Hemorrhages (___cm)] : no petechial hemorrhages [Skin Color & Pigmentation] : normal skin color and pigmentation [Skin Turgor] : normal skin turgor [] : no rash [No Venous Stasis] : no venous stasis [Skin Lesions] : no skin lesions [No Skin Ulcers] : no skin ulcer [No Xanthoma] : no  xanthoma was observed [Oriented To Time, Place, And Person] : oriented to person, place, and time [Mood] : the mood was normal [Affect] : the affect was normal [FreeTextEntry1] : limping  secondary to right knee pain

## 2019-08-15 NOTE — ASSESSMENT
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

## 2019-09-20 ENCOUNTER — APPOINTMENT (OUTPATIENT)
Dept: HEART AND VASCULAR | Facility: CLINIC | Age: 64
End: 2019-09-20
Payer: COMMERCIAL

## 2019-09-20 VITALS
OXYGEN SATURATION: 98 % | HEIGHT: 73 IN | DIASTOLIC BLOOD PRESSURE: 96 MMHG | HEART RATE: 97 BPM | WEIGHT: 259 LBS | RESPIRATION RATE: 14 BRPM | TEMPERATURE: 97.6 F | SYSTOLIC BLOOD PRESSURE: 154 MMHG | BODY MASS INDEX: 34.33 KG/M2

## 2019-09-20 PROCEDURE — 99214 OFFICE O/P EST MOD 30 MIN: CPT | Mod: 25

## 2019-09-20 PROCEDURE — 90686 IIV4 VACC NO PRSV 0.5 ML IM: CPT

## 2019-09-20 PROCEDURE — 93000 ELECTROCARDIOGRAM COMPLETE: CPT

## 2019-09-20 PROCEDURE — 90471 IMMUNIZATION ADMIN: CPT

## 2019-09-20 RX ORDER — ASPIRIN 325 MG/1
TABLET, FILM COATED ORAL
Refills: 0 | Status: DISCONTINUED | COMMUNITY
End: 2019-09-20

## 2019-09-20 NOTE — REASON FOR VISIT
[Cardiomyopathy] : cardiomyopathy [Follow-Up - Clinic] : a clinic follow-up of [Heart Failure] : congestive heart failure [Medication Management] : Medication management [Hypertension] : hypertension [FreeTextEntry1] : 64 year old male with  recently diagnosed  hypertensive cardiomyopathy and CHF  presents for follow up after titration of  medical therapy.

## 2019-09-20 NOTE — ASSESSMENT
[FreeTextEntry1] : Compensated CHF with marked hemodynamic improvement on medical regimen\par \par severe osteoarthritis of right knee

## 2019-09-20 NOTE — PHYSICAL EXAM
[General Appearance - Well Developed] : well developed [Normal Appearance] : normal appearance [No Deformities] : no deformities [Well Groomed] : well groomed [General Appearance - In No Acute Distress] : no acute distress [Normal Conjunctiva] : the conjunctiva exhibited no abnormalities [Eyelids - No Xanthelasma] : the eyelids demonstrated no xanthelasmas [No Oral Cyanosis] : no oral cyanosis [Normal Jugular Venous A Waves Present] : normal jugular venous A waves present [No Jugular Venous Bowie A Waves] : no jugular venous bowie A waves [Normal Jugular Venous V Waves Present] : normal jugular venous V waves present [Respiration, Rhythm And Depth] : normal respiratory rhythm and effort [Auscultation Breath Sounds / Voice Sounds] : lungs were clear to auscultation bilaterally [Exaggerated Use Of Accessory Muscles For Inspiration] : no accessory muscle use [Heart Sounds] : normal S1 and S2 [Heart Rate And Rhythm] : heart rate and rhythm were normal [Murmurs] : no murmurs present [Nail Clubbing] : no clubbing of the fingernails [Cyanosis, Localized] : no localized cyanosis [Petechial Hemorrhages (___cm)] : no petechial hemorrhages [Skin Color & Pigmentation] : normal skin color and pigmentation [Skin Turgor] : normal skin turgor [] : no rash [No Venous Stasis] : no venous stasis [Skin Lesions] : no skin lesions [No Skin Ulcers] : no skin ulcer [Oriented To Time, Place, And Person] : oriented to person, place, and time [No Xanthoma] : no  xanthoma was observed [Affect] : the affect was normal [Mood] : the mood was normal [FreeTextEntry1] : limping  secondary to right knee pain

## 2019-09-20 NOTE — DISCUSSION/SUMMARY
[FreeTextEntry1] : ready for surgery\par \par Medications reconciled and renewed\par \par Needs disability papers filled out \par \par High dose flu vaccine administered   right deltoid\par \par \par Rx fpr chest X ray

## 2019-09-20 NOTE — HISTORY OF PRESENT ILLNESS
[FreeTextEntry1] : EKG today shows marked improvement  in rate and prior T wave inversions normalized. No ectopy , ischemia \par \par ran out of carvedilol 3.125 mg po qd\par \par Tolerating  titrated dose of Entresto \par \par Really having pain of his right knee,  now ready for surgery

## 2019-09-24 ENCOUNTER — FORM ENCOUNTER (OUTPATIENT)
Age: 64
End: 2019-09-24

## 2019-09-25 ENCOUNTER — OUTPATIENT (OUTPATIENT)
Dept: OUTPATIENT SERVICES | Facility: HOSPITAL | Age: 64
LOS: 1 days | End: 2019-09-25
Payer: COMMERCIAL

## 2019-09-25 PROCEDURE — 71046 X-RAY EXAM CHEST 2 VIEWS: CPT | Mod: 26

## 2019-09-25 PROCEDURE — 71046 X-RAY EXAM CHEST 2 VIEWS: CPT

## 2019-10-18 ENCOUNTER — APPOINTMENT (OUTPATIENT)
Dept: HEART AND VASCULAR | Facility: CLINIC | Age: 64
End: 2019-10-18
Payer: COMMERCIAL

## 2019-10-18 VITALS
BODY MASS INDEX: 34.85 KG/M2 | HEIGHT: 73 IN | OXYGEN SATURATION: 98 % | RESPIRATION RATE: 16 BRPM | WEIGHT: 263 LBS | HEART RATE: 96 BPM | TEMPERATURE: 97.5 F | SYSTOLIC BLOOD PRESSURE: 160 MMHG | DIASTOLIC BLOOD PRESSURE: 100 MMHG

## 2019-10-18 DIAGNOSIS — I50.9 HEART FAILURE, UNSPECIFIED: ICD-10-CM

## 2019-10-18 DIAGNOSIS — R53.83 OTHER FATIGUE: ICD-10-CM

## 2019-10-18 DIAGNOSIS — R00.0 TACHYCARDIA, UNSPECIFIED: ICD-10-CM

## 2019-10-18 DIAGNOSIS — Z78.9 OTHER SPECIFIED HEALTH STATUS: ICD-10-CM

## 2019-10-18 PROCEDURE — 36415 COLL VENOUS BLD VENIPUNCTURE: CPT

## 2019-10-18 PROCEDURE — 93000 ELECTROCARDIOGRAM COMPLETE: CPT

## 2019-10-18 PROCEDURE — 99214 OFFICE O/P EST MOD 30 MIN: CPT

## 2019-10-18 NOTE — PHYSICAL EXAM
[General Appearance - Well Developed] : well developed [No Deformities] : no deformities [Normal Appearance] : normal appearance [Well Groomed] : well groomed [Normal Conjunctiva] : the conjunctiva exhibited no abnormalities [General Appearance - In No Acute Distress] : no acute distress [No Oral Cyanosis] : no oral cyanosis [Eyelids - No Xanthelasma] : the eyelids demonstrated no xanthelasmas [Normal Jugular Venous A Waves Present] : normal jugular venous A waves present [Normal Jugular Venous V Waves Present] : normal jugular venous V waves present [No Jugular Venous Bowie A Waves] : no jugular venous bowie A waves [Respiration, Rhythm And Depth] : normal respiratory rhythm and effort [Heart Rate And Rhythm] : heart rate and rhythm were normal [Auscultation Breath Sounds / Voice Sounds] : lungs were clear to auscultation bilaterally [Exaggerated Use Of Accessory Muscles For Inspiration] : no accessory muscle use [Murmurs] : no murmurs present [Heart Sounds] : normal S1 and S2 [FreeTextEntry1] : 1+ lower extremities  (improved)  [Nail Clubbing] : no clubbing of the fingernails [Petechial Hemorrhages (___cm)] : no petechial hemorrhages [Cyanosis, Localized] : no localized cyanosis [] : no rash [Skin Color & Pigmentation] : normal skin color and pigmentation [Skin Turgor] : normal skin turgor [No Venous Stasis] : no venous stasis [Skin Lesions] : no skin lesions [No Skin Ulcers] : no skin ulcer [No Xanthoma] : no  xanthoma was observed [Oriented To Time, Place, And Person] : oriented to person, place, and time [Affect] : the affect was normal [Mood] : the mood was normal

## 2019-10-18 NOTE — ASSESSMENT
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

## 2019-10-18 NOTE — REVIEW OF SYSTEMS
[Recent Weight Gain (___ Lbs)] : recent [unfilled] ~Ulb weight gain [Feeling Fatigued] : feeling fatigued [Dyspnea on exertion] : dyspnea during exertion [Recent Weight Loss (___ Lbs)] : no recent weight loss [Joint Pain] : joint pain [Lower Ext Edema] : lower extremity edema [Muscle Cramps] : no muscle cramps [Joint Stiffness] : joint stiffness [Joint Swelling] : no joint swelling [Limb Weakness (Paresis)] : limb weakness [Memory Lapses Or Loss] : no memory lapses or loss [Confusion] : no confusion was observed [Anxiety] : anxiety [Depression] : depression [Under Stress] : not under stress [Suicidal] : not suicidal [Negative] : Heme/Lymph

## 2019-10-18 NOTE — DISCUSSION/SUMMARY
[Left Ventricular Hypertrophy] : left ventricular hypertrophy [Stable] : stable [None] : There are no changes in medication management [Combined Systolic and Diastolic Heart Failure] : combined systolic and diastolic congestive heart failure [Fluid Restriction] : fluid restriction [Decompensated] : decompensated [Essential Hypertension] : essential hypertension [Weight Loss] : weight loss [Deteriorating] : deteriorating [With Me] : with me [Sodium Restriction] : sodium restriction [___ Week(s)] : [unfilled] week(s) [FreeTextEntry1] : medications reordered and dosages adjusted \par \par BNP level drawn \par \par 2 week follow up \par \par   Furosemide 40mg  bid  !\par \par \par  [de-identified] : Increase dose of carvedilol to 12.5mg po bid

## 2019-10-18 NOTE — REASON FOR VISIT
[Follow-Up - Clinic] : a clinic follow-up of [Heart Failure] : congestive heart failure [Cardiomyopathy] : cardiomyopathy [FreeTextEntry1] : 64 year old male with  hypertensive cardiomyopathy and CHF  presents for follow up after titration of  medical therapy. \par \par Supposed to be here for preop before right knee surgery \par \par Up to date with  HD Fluzone vaccination  9/19  [Hypertension] : hypertension

## 2019-10-18 NOTE — HISTORY OF PRESENT ILLNESS
[FreeTextEntry1] : Variably compliant with medications "ran out"  despite my ordering for 90 days with one refill  and I called [facility] to confirm they have his medication waiting for him\par \par He is not compliant with furosemide \par \par EKG shows   ST 11 bpm with  LAFB  and LVH with isolated T wave inversion in AVL, no acute ischemia or ectopy \par \par No falls, syncope, denies alcohol or illicit drug use \par \par

## 2019-10-20 LAB
ALBUMIN SERPL ELPH-MCNC: 4.4 G/DL
ALP BLD-CCNC: 82 U/L
ALT SERPL-CCNC: 6 U/L
ANION GAP SERPL CALC-SCNC: 14 MMOL/L
APPEARANCE: CLEAR
AST SERPL-CCNC: 11 U/L
BACTERIA: NEGATIVE
BASOPHILS # BLD AUTO: 0.02 K/UL
BASOPHILS NFR BLD AUTO: 0.3 %
BILIRUB SERPL-MCNC: 0.5 MG/DL
BILIRUBIN URINE: NEGATIVE
BLOOD URINE: NEGATIVE
BUN SERPL-MCNC: 18 MG/DL
CALCIUM SERPL-MCNC: 9.8 MG/DL
CHLORIDE SERPL-SCNC: 105 MMOL/L
CO2 SERPL-SCNC: 24 MMOL/L
COLOR: YELLOW
CREAT SERPL-MCNC: 1.1 MG/DL
EOSINOPHIL # BLD AUTO: 0.16 K/UL
EOSINOPHIL NFR BLD AUTO: 2.5 %
GGT SERPL-CCNC: 23 U/L
GLUCOSE QUALITATIVE U: NEGATIVE
HCT VFR BLD CALC: 47.7 %
HGB BLD-MCNC: 15.3 G/DL
HYALINE CASTS: 3 /LPF
IMM GRANULOCYTES NFR BLD AUTO: 0.2 %
KETONES URINE: NEGATIVE
LEUKOCYTE ESTERASE URINE: NEGATIVE
LYMPHOCYTES # BLD AUTO: 1.57 K/UL
LYMPHOCYTES NFR BLD AUTO: 24.8 %
MAGNESIUM SERPL-MCNC: 2 MG/DL
MAN DIFF?: NORMAL
MCHC RBC-ENTMCNC: 30.8 PG
MCHC RBC-ENTMCNC: 32.1 GM/DL
MCV RBC AUTO: 96.2 FL
MICROSCOPIC-UA: NORMAL
MONOCYTES # BLD AUTO: 0.65 K/UL
MONOCYTES NFR BLD AUTO: 10.3 %
NEUTROPHILS # BLD AUTO: 3.92 K/UL
NEUTROPHILS NFR BLD AUTO: 61.9 %
NITRITE URINE: NEGATIVE
NT-PROBNP SERPL-MCNC: 304 PG/ML
PH URINE: 5.5
PLATELET # BLD AUTO: 236 K/UL
POTASSIUM SERPL-SCNC: 4.1 MMOL/L
PROT SERPL-MCNC: 7.3 G/DL
PROTEIN URINE: ABNORMAL
RBC # BLD: 4.96 M/UL
RBC # FLD: 16.8 %
RED BLOOD CELLS URINE: 3 /HPF
SODIUM SERPL-SCNC: 143 MMOL/L
SPECIFIC GRAVITY URINE: 1.04
SQUAMOUS EPITHELIAL CELLS: 1 /HPF
UROBILINOGEN URINE: ABNORMAL
WBC # FLD AUTO: 6.33 K/UL
WHITE BLOOD CELLS URINE: 2 /HPF

## 2019-10-30 ENCOUNTER — APPOINTMENT (OUTPATIENT)
Dept: HEART AND VASCULAR | Facility: CLINIC | Age: 64
End: 2019-10-30
Payer: COMMERCIAL

## 2019-10-30 VITALS
SYSTOLIC BLOOD PRESSURE: 130 MMHG | HEIGHT: 73 IN | OXYGEN SATURATION: 96 % | BODY MASS INDEX: 34.72 KG/M2 | HEART RATE: 85 BPM | TEMPERATURE: 97.2 F | WEIGHT: 262 LBS | DIASTOLIC BLOOD PRESSURE: 80 MMHG | RESPIRATION RATE: 12 BRPM

## 2019-10-30 DIAGNOSIS — I44.60 UNSPECIFIED FASCICULAR BLOCK: ICD-10-CM

## 2019-10-30 PROCEDURE — 93000 ELECTROCARDIOGRAM COMPLETE: CPT

## 2019-10-30 PROCEDURE — 36415 COLL VENOUS BLD VENIPUNCTURE: CPT

## 2019-10-30 PROCEDURE — 99213 OFFICE O/P EST LOW 20 MIN: CPT | Mod: 57

## 2019-10-31 LAB
ALBUMIN SERPL ELPH-MCNC: 4.3 G/DL
ALP BLD-CCNC: 81 U/L
ALT SERPL-CCNC: 10 U/L
ANION GAP SERPL CALC-SCNC: 12 MMOL/L
APPEARANCE: CLEAR
APTT BLD: 32.3 SEC
AST SERPL-CCNC: 12 U/L
BASOPHILS # BLD AUTO: 0.01 K/UL
BASOPHILS NFR BLD AUTO: 0.2 %
BILIRUB SERPL-MCNC: 0.5 MG/DL
BILIRUBIN URINE: NEGATIVE
BLOOD URINE: NEGATIVE
BUN SERPL-MCNC: 18 MG/DL
CALCIUM SERPL-MCNC: 9.8 MG/DL
CHLORIDE SERPL-SCNC: 100 MMOL/L
CHOLEST SERPL-MCNC: 211 MG/DL
CHOLEST/HDLC SERPL: 4.6 RATIO
CO2 SERPL-SCNC: 30 MMOL/L
COLOR: YELLOW
CREAT SERPL-MCNC: 1.41 MG/DL
EOSINOPHIL # BLD AUTO: 0.12 K/UL
EOSINOPHIL NFR BLD AUTO: 2.1 %
ESTIMATED AVERAGE GLUCOSE: 126 MG/DL
GLUCOSE QUALITATIVE U: NEGATIVE
GLUCOSE SERPL-MCNC: 97 MG/DL
HBA1C MFR BLD HPLC: 6 %
HCT VFR BLD CALC: 47.4 %
HDLC SERPL-MCNC: 46 MG/DL
HGB BLD-MCNC: 14.9 G/DL
IMM GRANULOCYTES NFR BLD AUTO: 0.3 %
INR PPP: 1.02 RATIO
KETONES URINE: NEGATIVE
LDLC SERPL CALC-MCNC: 132 MG/DL
LEUKOCYTE ESTERASE URINE: NEGATIVE
LYMPHOCYTES # BLD AUTO: 1.67 K/UL
LYMPHOCYTES NFR BLD AUTO: 28.6 %
MAN DIFF?: NORMAL
MCHC RBC-ENTMCNC: 30.2 PG
MCHC RBC-ENTMCNC: 31.4 GM/DL
MCV RBC AUTO: 96.1 FL
MONOCYTES # BLD AUTO: 0.54 K/UL
MONOCYTES NFR BLD AUTO: 9.2 %
NEUTROPHILS # BLD AUTO: 3.48 K/UL
NEUTROPHILS NFR BLD AUTO: 59.6 %
NITRITE URINE: NEGATIVE
PH URINE: 5.5
PLATELET # BLD AUTO: 297 K/UL
POTASSIUM SERPL-SCNC: 4.2 MMOL/L
PROT SERPL-MCNC: 7 G/DL
PROTEIN URINE: NORMAL
PT BLD: 11.7 SEC
RBC # BLD: 4.93 M/UL
RBC # FLD: 16.7 %
SODIUM SERPL-SCNC: 142 MMOL/L
SPECIFIC GRAVITY URINE: 1.02
TRIGL SERPL-MCNC: 164 MG/DL
TSH SERPL-ACNC: 1.57 UIU/ML
UROBILINOGEN URINE: NORMAL
WBC # FLD AUTO: 5.84 K/UL

## 2019-11-12 NOTE — REASON FOR VISIT
[Follow-Up - Clinic] : a clinic follow-up of [Hypertension] : hypertension [Cardiomyopathy] : cardiomyopathy [FreeTextEntry1] : 64 year old male with  hypertensive cardiomyopathy and CHF  presents for follow up after titration of  medical therapy. \par \par Supposed to be here for preop before right knee surgery \par \par Up to date with  HD Fluzone vaccination  9/19

## 2019-11-12 NOTE — DISCUSSION/SUMMARY
[Essential Hypertension] : essential hypertension [Improving] : improving [Responding to Treatment] : responding to treatment [None] : none [With Me] : with me [de-identified] : hypertensive cardiomyopathy  [FreeTextEntry1] : Now acceptable risk for planned right total knee replacement surgery  with DVT prophylaxis \par \par venipuncture performed \par \par Rx provided for preop CXR \par \par Dr. Ramirez's office contacted  to set up surgery  with DVT prophylaxis

## 2019-11-12 NOTE — HISTORY OF PRESENT ILLNESS
[FreeTextEntry1] : Reports compliance with all medications \par \par Less dyspnea \par \par Prediabetes \par \par major complaint is chronic  right knee pain and marked difficulty walking

## 2019-11-12 NOTE — REVIEW OF SYSTEMS
[Feeling Fatigued] : feeling fatigued [Recent Weight Loss (___ Lbs)] : recent [unfilled] ~Ulb weight loss [Dyspnea on exertion] : dyspnea during exertion [Lower Ext Edema] : lower extremity edema [Joint Pain] : joint pain [Joint Stiffness] : joint stiffness [Limb Weakness (Paresis)] : limb weakness [Depression] : depression [Anxiety] : anxiety [Negative] : Heme/Lymph [Recent Weight Gain (___ Lbs)] : no recent weight gain [Joint Swelling] : no joint swelling [Muscle Cramps] : no muscle cramps [Confusion] : no confusion was observed [Memory Lapses Or Loss] : no memory lapses or loss [Under Stress] : not under stress [Suicidal] : not suicidal

## 2019-11-29 ENCOUNTER — RX RENEWAL (OUTPATIENT)
Age: 64
End: 2019-11-29

## 2019-12-11 ENCOUNTER — APPOINTMENT (OUTPATIENT)
Dept: HEART AND VASCULAR | Facility: CLINIC | Age: 64
End: 2019-12-11

## 2020-01-17 ENCOUNTER — APPOINTMENT (OUTPATIENT)
Dept: HEART AND VASCULAR | Facility: CLINIC | Age: 65
End: 2020-01-17
Payer: COMMERCIAL

## 2020-01-17 VITALS
TEMPERATURE: 98 F | WEIGHT: 264 LBS | HEIGHT: 73 IN | OXYGEN SATURATION: 98 % | BODY MASS INDEX: 34.99 KG/M2 | DIASTOLIC BLOOD PRESSURE: 80 MMHG | HEART RATE: 78 BPM | SYSTOLIC BLOOD PRESSURE: 152 MMHG | RESPIRATION RATE: 14 BRPM

## 2020-01-17 PROCEDURE — 36415 COLL VENOUS BLD VENIPUNCTURE: CPT

## 2020-01-17 PROCEDURE — 93000 ELECTROCARDIOGRAM COMPLETE: CPT

## 2020-01-17 PROCEDURE — 93306 TTE W/DOPPLER COMPLETE: CPT

## 2020-01-17 PROCEDURE — 99214 OFFICE O/P EST MOD 30 MIN: CPT | Mod: 57

## 2020-01-18 LAB
ALBUMIN SERPL ELPH-MCNC: 4.1 G/DL
ALP BLD-CCNC: 87 U/L
ALT SERPL-CCNC: 7 U/L
ANION GAP SERPL CALC-SCNC: 12 MMOL/L
APPEARANCE: CLEAR
APTT BLD: 33.9 SEC
AST SERPL-CCNC: 11 U/L
BASOPHILS # BLD AUTO: 0.02 K/UL
BASOPHILS NFR BLD AUTO: 0.4 %
BILIRUB SERPL-MCNC: 0.3 MG/DL
BILIRUBIN URINE: NEGATIVE
BLOOD URINE: NEGATIVE
BUN SERPL-MCNC: 16 MG/DL
CALCIUM SERPL-MCNC: 9.1 MG/DL
CHLORIDE SERPL-SCNC: 104 MMOL/L
CHOLEST SERPL-MCNC: 192 MG/DL
CHOLEST/HDLC SERPL: 4 RATIO
CO2 SERPL-SCNC: 28 MMOL/L
COLOR: YELLOW
CREAT SERPL-MCNC: 1.23 MG/DL
EOSINOPHIL # BLD AUTO: 0.09 K/UL
EOSINOPHIL NFR BLD AUTO: 2 %
ESTIMATED AVERAGE GLUCOSE: 131 MG/DL
GLUCOSE QUALITATIVE U: NEGATIVE
GLUCOSE SERPL-MCNC: 91 MG/DL
HBA1C MFR BLD HPLC: 6.2 %
HCT VFR BLD CALC: 46.8 %
HDLC SERPL-MCNC: 48 MG/DL
HGB BLD-MCNC: 14.6 G/DL
IMM GRANULOCYTES NFR BLD AUTO: 0.2 %
INR PPP: 0.97 RATIO
KETONES URINE: NEGATIVE
LDLC SERPL CALC-MCNC: 126 MG/DL
LEUKOCYTE ESTERASE URINE: NEGATIVE
LYMPHOCYTES # BLD AUTO: 1.47 K/UL
LYMPHOCYTES NFR BLD AUTO: 32.7 %
MAN DIFF?: NORMAL
MCHC RBC-ENTMCNC: 31.1 PG
MCHC RBC-ENTMCNC: 31.2 GM/DL
MCV RBC AUTO: 99.8 FL
MONOCYTES # BLD AUTO: 0.43 K/UL
MONOCYTES NFR BLD AUTO: 9.6 %
NEUTROPHILS # BLD AUTO: 2.47 K/UL
NEUTROPHILS NFR BLD AUTO: 55.1 %
NITRITE URINE: NEGATIVE
PH URINE: 5.5
PLATELET # BLD AUTO: 264 K/UL
POTASSIUM SERPL-SCNC: 4.4 MMOL/L
PROT SERPL-MCNC: 6.8 G/DL
PROTEIN URINE: NORMAL
PT BLD: 10.9 SEC
RBC # BLD: 4.69 M/UL
RBC # FLD: 15.8 %
SODIUM SERPL-SCNC: 143 MMOL/L
SPECIFIC GRAVITY URINE: 1.03
TRIGL SERPL-MCNC: 89 MG/DL
TSH SERPL-ACNC: 1.68 UIU/ML
UROBILINOGEN URINE: ABNORMAL
WBC # FLD AUTO: 4.49 K/UL

## 2020-01-21 NOTE — REASON FOR VISIT
[Follow-Up - Clinic] : a clinic follow-up of [Abnormal ECG] : an abnormal ECG [Dyspnea] : dyspnea [Cardiomyopathy] : cardiomyopathy [Hypertension] : hypertension [FreeTextEntry1] : 64 year old male with  hypertensive cardiomyopathy and CHF  presents for follow up after titration of  medical therapy. \par \par Supposed to be here for preop before right knee surgery \par \par Up to date with  HD Fluzone vaccination  9/19

## 2020-01-21 NOTE — HISTORY OF PRESENT ILLNESS
[FreeTextEntry1] : EKG shows NSR 67 bpm with left axis ,no ectopy, ischemia or LVH\par \par No syncope, edema improving\par \par right knee pain secondary to severe arthritis \par \par

## 2020-01-21 NOTE — DISCUSSION/SUMMARY
[Cardiomyopathy] : cardiomyopathy [Outpatient Evaluation] : outpatient evaluation [Echocardiogram] : echocardiogram [Essential Hypertension] : essential hypertension [Improving] : improving [Responding to Treatment] : responding to treatment [None] : none [Weight Loss] : weight loss [Sodium Restriction] : sodium restriction [___ Month(s)] : [unfilled] month(s) [With Me] : with me [FreeTextEntry1] : venipuncture preop labs \par \par \par echocardiogram today to reassess LV function \par \par Patient cleared for surgery with appropriate anesthesia  and double precautions for  DVT prophylaxis

## 2020-01-21 NOTE — REVIEW OF SYSTEMS
[Recent Weight Gain (___ Lbs)] : recent [unfilled] ~Ulb weight gain [Feeling Fatigued] : feeling fatigued [Dyspnea on exertion] : dyspnea during exertion [Joint Pain] : joint pain [Lower Ext Edema] : lower extremity edema [Joint Stiffness] : joint stiffness [Limb Weakness (Paresis)] : limb weakness [Anxiety] : anxiety [Depression] : depression [Negative] : Heme/Lymph [Recent Weight Loss (___ Lbs)] : no recent weight loss [Joint Swelling] : no joint swelling [Muscle Cramps] : no muscle cramps [Confusion] : no confusion was observed [Memory Lapses Or Loss] : no memory lapses or loss [Under Stress] : not under stress [Suicidal] : not suicidal

## 2020-01-21 NOTE — PHYSICAL EXAM
[General Appearance - Well Developed] : well developed [Normal Appearance] : normal appearance [Well Groomed] : well groomed [No Deformities] : no deformities [General Appearance - In No Acute Distress] : no acute distress [Normal Conjunctiva] : the conjunctiva exhibited no abnormalities [Eyelids - No Xanthelasma] : the eyelids demonstrated no xanthelasmas [No Oral Cyanosis] : no oral cyanosis [Normal Jugular Venous A Waves Present] : normal jugular venous A waves present [Normal Jugular Venous V Waves Present] : normal jugular venous V waves present [No Jugular Venous Bowie A Waves] : no jugular venous bowie A waves [Respiration, Rhythm And Depth] : normal respiratory rhythm and effort [Exaggerated Use Of Accessory Muscles For Inspiration] : no accessory muscle use [Heart Rate And Rhythm] : heart rate and rhythm were normal [Auscultation Breath Sounds / Voice Sounds] : lungs were clear to auscultation bilaterally [Heart Sounds] : normal S1 and S2 [Murmurs] : no murmurs present [Nail Clubbing] : no clubbing of the fingernails [Cyanosis, Localized] : no localized cyanosis [Petechial Hemorrhages (___cm)] : no petechial hemorrhages [Skin Color & Pigmentation] : normal skin color and pigmentation [] : no rash [Skin Turgor] : normal skin turgor [No Venous Stasis] : no venous stasis [No Skin Ulcers] : no skin ulcer [Skin Lesions] : no skin lesions [No Xanthoma] : no  xanthoma was observed [Oriented To Time, Place, And Person] : oriented to person, place, and time [Affect] : the affect was normal [Mood] : the mood was normal [FreeTextEntry1] : limping  secondary to right knee pain

## 2020-02-03 NOTE — H&P ADULT - NSICDXPASTMEDICALHX_GEN_ALL_CORE_FT
PAST MEDICAL HISTORY:  Alcohol abuse     Anxiety and depression     AV block, 1st degree     CHF (congestive heart failure)     Dyspnea     Hypertensive cardiomyopathy     Mitral regurgitation     Osteoarthritis right knee    Sinus tachycardia

## 2020-02-03 NOTE — H&P ADULT - HISTORY OF PRESENT ILLNESS
64M with right knee pain x  Presents for elective right total knee replacement 64M with right knee pain x 4-5 years worsened over time without improvement. Failed conservative treatments. Denies numbness, tingling. Ambulates with a cane. Pt denies any recent fevers/chills, chest pain, or shortness of breath. Denies history of DVT/PE. Presents today for elective right total knee replacement

## 2020-02-03 NOTE — H&P ADULT - PROBLEM SELECTOR PLAN 1
Admit to Orthopaedic Service.  Presents today for elective right TKR   Pt medically stable and cleared for procedure today by Dr. Sen

## 2020-02-03 NOTE — H&P ADULT - NSHPPHYSICALEXAM_GEN_ALL_CORE
MSK:  decreased ROM right knee 2/2 pain NAD, sitting comfortably in chair  MSK: Decreased ROM of right knee secondary to pain, sensation intact to light touch in bilateral lower extremities, DP 2+ bilaterally, EHL/FHL/TA/GS 5/5 BLE  Rest of PE per medical clearance

## 2020-02-03 NOTE — H&P ADULT - NSHPLABSRESULTS_GEN_ALL_CORE
preop PT/INR/PTT/CBC/CMP/UA - within normal limits, reviewed by medical clearance   CXR: Within normal limits, per medical clearance.   EKG - sinus rhythm, left axis deviation, reviewed by medical clearance  Povidone iodine nasal swab to be given day of surgery

## 2020-02-04 ENCOUNTER — APPOINTMENT (OUTPATIENT)
Dept: ORTHOPEDIC SURGERY | Facility: HOSPITAL | Age: 65
End: 2020-02-04

## 2020-02-12 PROBLEM — F10.10 ALCOHOL ABUSE, UNCOMPLICATED: Chronic | Status: ACTIVE | Noted: 2020-02-03

## 2020-02-12 PROBLEM — I11.9 HYPERTENSIVE HEART DISEASE WITHOUT HEART FAILURE: Chronic | Status: ACTIVE | Noted: 2020-02-03

## 2020-02-12 PROBLEM — I50.9 HEART FAILURE, UNSPECIFIED: Chronic | Status: ACTIVE | Noted: 2020-02-03

## 2020-02-12 PROBLEM — R06.00 DYSPNEA, UNSPECIFIED: Chronic | Status: ACTIVE | Noted: 2020-02-03

## 2020-02-12 PROBLEM — I34.0 NONRHEUMATIC MITRAL (VALVE) INSUFFICIENCY: Chronic | Status: ACTIVE | Noted: 2020-02-03

## 2020-02-12 PROBLEM — I44.0 ATRIOVENTRICULAR BLOCK, FIRST DEGREE: Chronic | Status: ACTIVE | Noted: 2020-02-03

## 2020-02-12 PROBLEM — R00.0 TACHYCARDIA, UNSPECIFIED: Chronic | Status: ACTIVE | Noted: 2020-02-03

## 2020-02-12 PROBLEM — F41.9 ANXIETY DISORDER, UNSPECIFIED: Chronic | Status: ACTIVE | Noted: 2020-02-03

## 2020-02-12 PROBLEM — M19.90 UNSPECIFIED OSTEOARTHRITIS, UNSPECIFIED SITE: Chronic | Status: ACTIVE | Noted: 2020-02-03

## 2020-02-19 ENCOUNTER — APPOINTMENT (OUTPATIENT)
Dept: ORTHOPEDIC SURGERY | Facility: CLINIC | Age: 65
End: 2020-02-19
Payer: COMMERCIAL

## 2020-02-19 DIAGNOSIS — M17.10 UNILATERAL PRIMARY OSTEOARTHRITIS, UNSPECIFIED KNEE: ICD-10-CM

## 2020-02-19 PROCEDURE — 99213 OFFICE O/P EST LOW 20 MIN: CPT

## 2020-02-20 NOTE — ASSESSMENT
[FreeTextEntry1] : Assessment/Plan\par \par RIGHT Knee arthritis \par \par Will reschedule right total knee arthroplasty\par \par Patient will benefit from the proposed procedure, she has failed a conservative treatment plan of supervised physical therapy, anti-inflammatory medications, and activity modification. She continues to have pain impacting her ability to perform ADL's and has a decreasing QoL. We will schedule this for the earliest mutually convenient time after the appropriate pre-op laboratory tests and clearances are obtained.  All questions were answered and a thorough explanation of RBA were given\par \par All medical record entries made by the PA/Scribe/Fellow are at my, Dr. Nick Ramirez's direction and personally dictated by me on 02/19/2020]. I have reviewed the chart and agree that the record accurately reflects my personal performance of the history, physical exam, assessment, and plan. I have also personally directed reviewed, and agreed with the chart.\par \par Will prescribe Celecoxib for pain management\par \par F/U when schedules for surgery\par

## 2020-02-20 NOTE — HISTORY OF PRESENT ILLNESS
[de-identified] : Jarek is a 64 year old male here for follow up of right knee pain. Patient had scheduled for a right total hip arthroplasty but he had canceled it because he was not ready to go to surgery. He complains his right knee is affecting his quality of life and wants to reschedule his surgery right MILES. \par

## 2020-02-20 NOTE — PHYSICAL EXAM
[de-identified] : General: Not in acute distress, dressed appropriately, sitting on examination table\par Skin: Warm and dry, normal turgor, no rashes\par Neurological: AOx3, Cranial nerves grossly in tact\par Psych: Mood and affect appropriate\par \par Right Knee: Alignment: Neutral Non-Tender: ROM: 0-120 Stable 5/5 Strength. DNVI. Ambulates with a cane. \par \par \par  [de-identified] : No imaging today.\par

## 2020-02-25 ENCOUNTER — APPOINTMENT (OUTPATIENT)
Dept: HEART AND VASCULAR | Facility: CLINIC | Age: 65
End: 2020-02-25
Payer: COMMERCIAL

## 2020-02-25 ENCOUNTER — LABORATORY RESULT (OUTPATIENT)
Age: 65
End: 2020-02-25

## 2020-02-25 VITALS
HEIGHT: 73 IN | SYSTOLIC BLOOD PRESSURE: 150 MMHG | OXYGEN SATURATION: 97 % | WEIGHT: 257 LBS | BODY MASS INDEX: 34.06 KG/M2 | HEART RATE: 84 BPM | DIASTOLIC BLOOD PRESSURE: 96 MMHG | RESPIRATION RATE: 14 BRPM

## 2020-02-25 DIAGNOSIS — Z01.818 ENCOUNTER FOR OTHER PREPROCEDURAL EXAMINATION: ICD-10-CM

## 2020-02-25 DIAGNOSIS — I11.9 HYPERTENSIVE HEART DISEASE W/OUT HEART FAILURE: ICD-10-CM

## 2020-02-25 DIAGNOSIS — I44.0 ATRIOVENTRICULAR BLOCK, FIRST DEGREE: ICD-10-CM

## 2020-02-25 PROCEDURE — 36415 COLL VENOUS BLD VENIPUNCTURE: CPT

## 2020-02-25 PROCEDURE — 99213 OFFICE O/P EST LOW 20 MIN: CPT | Mod: 57

## 2020-02-25 NOTE — DISCUSSION/SUMMARY
[Patient Low Risk] : the patient is a low surgical risk [Procedure Low Risk] : the procedure risk is low [As per surgery] : as per surgery [Continue] : Continue medications as currently directed [Optimized for Surgery] : the patient is optimized for surgery [FreeTextEntry1] : Recent CXR clear,  EKG stable \par \par increase dose of escitalopram to 20mg po qd

## 2020-02-25 NOTE — REVIEW OF SYSTEMS
[Recent Weight Gain (___ Lbs)] : no recent weight gain [Feeling Fatigued] : not feeling fatigued [Recent Weight Loss (___ Lbs)] : recent [unfilled] ~Ulb weight loss [Dyspnea on exertion] : dyspnea during exertion [Joint Pain] : joint pain [Lower Ext Edema] : lower extremity edema [Joint Swelling] : no joint swelling [Joint Stiffness] : joint stiffness [Confusion] : no confusion was observed [Muscle Cramps] : no muscle cramps [Limb Weakness (Paresis)] : limb weakness [Memory Lapses Or Loss] : no memory lapses or loss [Anxiety] : no anxiety [Depression] : depression [Under Stress] : not under stress [Suicidal] : not suicidal [Negative] : Heme/Lymph

## 2020-02-25 NOTE — PHYSICAL EXAM
[General Appearance - Well Developed] : well developed [Normal Appearance] : normal appearance [No Deformities] : no deformities [Well Groomed] : well groomed [General Appearance - In No Acute Distress] : no acute distress [Normal Conjunctiva] : the conjunctiva exhibited no abnormalities [Eyelids - No Xanthelasma] : the eyelids demonstrated no xanthelasmas [No Oral Cyanosis] : no oral cyanosis [Normal Jugular Venous V Waves Present] : normal jugular venous V waves present [Normal Jugular Venous A Waves Present] : normal jugular venous A waves present [No Jugular Venous Bowie A Waves] : no jugular venous bowie A waves [Respiration, Rhythm And Depth] : normal respiratory rhythm and effort [Exaggerated Use Of Accessory Muscles For Inspiration] : no accessory muscle use [Heart Rate And Rhythm] : heart rate and rhythm were normal [Auscultation Breath Sounds / Voice Sounds] : lungs were clear to auscultation bilaterally [Heart Sounds] : normal S1 and S2 [Murmurs] : no murmurs present [Nail Clubbing] : no clubbing of the fingernails [FreeTextEntry1] : limping  secondary to right knee pain  [Petechial Hemorrhages (___cm)] : no petechial hemorrhages [Cyanosis, Localized] : no localized cyanosis [Skin Turgor] : normal skin turgor [No Venous Stasis] : no venous stasis [Skin Color & Pigmentation] : normal skin color and pigmentation [] : no rash [Skin Lesions] : no skin lesions [No Skin Ulcers] : no skin ulcer [No Xanthoma] : no  xanthoma was observed [Affect] : the affect was normal [Oriented To Time, Place, And Person] : oriented to person, place, and time [Mood] : the mood was normal

## 2020-02-27 ENCOUNTER — APPOINTMENT (OUTPATIENT)
Dept: HEART AND VASCULAR | Facility: CLINIC | Age: 65
End: 2020-02-27

## 2020-02-27 LAB
ALBUMIN SERPL ELPH-MCNC: 4.2 G/DL
ALP BLD-CCNC: 84 U/L
ALT SERPL-CCNC: 6 U/L
ANION GAP SERPL CALC-SCNC: 10 MMOL/L
APPEARANCE: CLEAR
APTT BLD: 33.7 SEC
AST SERPL-CCNC: 12 U/L
BASOPHILS # BLD AUTO: 0.03 K/UL
BASOPHILS NFR BLD AUTO: 0.6 %
BILIRUB SERPL-MCNC: 0.4 MG/DL
BILIRUBIN URINE: NEGATIVE
BLOOD URINE: NEGATIVE
BUN SERPL-MCNC: 17 MG/DL
CALCIUM SERPL-MCNC: 9.5 MG/DL
CHLORIDE SERPL-SCNC: 104 MMOL/L
CHOLEST SERPL-MCNC: 199 MG/DL
CHOLEST/HDLC SERPL: 4.9 RATIO
CO2 SERPL-SCNC: 27 MMOL/L
COLOR: YELLOW
CREAT SERPL-MCNC: 1.37 MG/DL
EOSINOPHIL # BLD AUTO: 0.11 K/UL
EOSINOPHIL NFR BLD AUTO: 2.4 %
ESTIMATED AVERAGE GLUCOSE: 126 MG/DL
GLUCOSE QUALITATIVE U: NEGATIVE
GLUCOSE SERPL-MCNC: 103 MG/DL
HBA1C MFR BLD HPLC: 6 %
HCT VFR BLD CALC: 46.8 %
HDLC SERPL-MCNC: 41 MG/DL
HGB BLD-MCNC: 14.3 G/DL
IMM GRANULOCYTES NFR BLD AUTO: 0 %
INR PPP: 1.12 RATIO
KETONES URINE: NEGATIVE
LDLC SERPL CALC-MCNC: 138 MG/DL
LEUKOCYTE ESTERASE URINE: NEGATIVE
LYMPHOCYTES # BLD AUTO: 1.35 K/UL
LYMPHOCYTES NFR BLD AUTO: 29.2 %
MAN DIFF?: NORMAL
MCHC RBC-ENTMCNC: 30.4 PG
MCHC RBC-ENTMCNC: 30.6 GM/DL
MCV RBC AUTO: 99.6 FL
MONOCYTES # BLD AUTO: 0.43 K/UL
MONOCYTES NFR BLD AUTO: 9.3 %
NEUTROPHILS # BLD AUTO: 2.7 K/UL
NEUTROPHILS NFR BLD AUTO: 58.5 %
NITRITE URINE: NEGATIVE
PH URINE: 5.5
PLATELET # BLD AUTO: 338 K/UL
POTASSIUM SERPL-SCNC: 3.9 MMOL/L
PROT SERPL-MCNC: 6.5 G/DL
PROTEIN URINE: ABNORMAL
PT BLD: 12.7 SEC
RBC # BLD: 4.7 M/UL
RBC # FLD: 15.1 %
SODIUM SERPL-SCNC: 142 MMOL/L
SPECIFIC GRAVITY URINE: 1.03
TRIGL SERPL-MCNC: 100 MG/DL
TSH SERPL-ACNC: 1.74 UIU/ML
UROBILINOGEN URINE: ABNORMAL
WBC # FLD AUTO: 4.62 K/UL

## 2020-03-02 ENCOUNTER — TRANSCRIPTION ENCOUNTER (OUTPATIENT)
Age: 65
End: 2020-03-02

## 2020-03-03 ENCOUNTER — APPOINTMENT (OUTPATIENT)
Dept: ORTHOPEDIC SURGERY | Facility: HOSPITAL | Age: 65
End: 2020-03-03
Payer: COMMERCIAL

## 2020-03-03 ENCOUNTER — INPATIENT (INPATIENT)
Facility: HOSPITAL | Age: 65
LOS: 2 days | Discharge: NURSING, REHAB OR LONG-TERM CARE | DRG: 470 | End: 2020-03-06
Attending: ORTHOPAEDIC SURGERY | Admitting: ORTHOPAEDIC SURGERY
Payer: COMMERCIAL

## 2020-03-03 VITALS
SYSTOLIC BLOOD PRESSURE: 156 MMHG | DIASTOLIC BLOOD PRESSURE: 92 MMHG | WEIGHT: 257.28 LBS | TEMPERATURE: 98 F | HEIGHT: 74 IN | OXYGEN SATURATION: 98 % | HEART RATE: 69 BPM | RESPIRATION RATE: 16 BRPM

## 2020-03-03 DIAGNOSIS — F41.9 ANXIETY DISORDER, UNSPECIFIED: ICD-10-CM

## 2020-03-03 DIAGNOSIS — Z98.890 OTHER SPECIFIED POSTPROCEDURAL STATES: Chronic | ICD-10-CM

## 2020-03-03 DIAGNOSIS — I11.9 HYPERTENSIVE HEART DISEASE WITHOUT HEART FAILURE: ICD-10-CM

## 2020-03-03 DIAGNOSIS — I50.9 HEART FAILURE, UNSPECIFIED: ICD-10-CM

## 2020-03-03 DIAGNOSIS — I34.0 NONRHEUMATIC MITRAL (VALVE) INSUFFICIENCY: ICD-10-CM

## 2020-03-03 DIAGNOSIS — M19.90 UNSPECIFIED OSTEOARTHRITIS, UNSPECIFIED SITE: ICD-10-CM

## 2020-03-03 DIAGNOSIS — I44.0 ATRIOVENTRICULAR BLOCK, FIRST DEGREE: ICD-10-CM

## 2020-03-03 LAB
MRSA PCR RESULT.: NEGATIVE — SIGNIFICANT CHANGE UP
S AUREUS DNA NOSE QL NAA+PROBE: NEGATIVE — SIGNIFICANT CHANGE UP

## 2020-03-03 PROCEDURE — 27447 TOTAL KNEE ARTHROPLASTY: CPT | Mod: RT

## 2020-03-03 PROCEDURE — 73560 X-RAY EXAM OF KNEE 1 OR 2: CPT | Mod: 26,RT

## 2020-03-03 RX ORDER — FUROSEMIDE 40 MG
1 TABLET ORAL
Qty: 0 | Refills: 0 | DISCHARGE

## 2020-03-03 RX ORDER — ISOSORBIDE DINITRATE 5 MG/1
1 TABLET ORAL
Qty: 0 | Refills: 0 | DISCHARGE

## 2020-03-03 RX ORDER — SODIUM CHLORIDE 9 MG/ML
1000 INJECTION, SOLUTION INTRAVENOUS
Refills: 0 | Status: DISCONTINUED | OUTPATIENT
Start: 2020-03-03 | End: 2020-03-03

## 2020-03-03 RX ORDER — CARVEDILOL PHOSPHATE 80 MG/1
12.5 CAPSULE, EXTENDED RELEASE ORAL EVERY 12 HOURS
Refills: 0 | Status: DISCONTINUED | OUTPATIENT
Start: 2020-03-03 | End: 2020-03-06

## 2020-03-03 RX ORDER — CARVEDILOL PHOSPHATE 80 MG/1
1 CAPSULE, EXTENDED RELEASE ORAL
Qty: 0 | Refills: 0 | DISCHARGE

## 2020-03-03 RX ORDER — CHLORHEXIDINE GLUCONATE 213 G/1000ML
1 SOLUTION TOPICAL ONCE
Refills: 0 | Status: DISCONTINUED | OUTPATIENT
Start: 2020-03-03 | End: 2020-03-06

## 2020-03-03 RX ORDER — MORPHINE SULFATE 50 MG/1
2 CAPSULE, EXTENDED RELEASE ORAL
Refills: 0 | Status: DISCONTINUED | OUTPATIENT
Start: 2020-03-03 | End: 2020-03-06

## 2020-03-03 RX ORDER — SACUBITRIL AND VALSARTAN 24; 26 MG/1; MG/1
1 TABLET, FILM COATED ORAL
Refills: 0 | Status: DISCONTINUED | OUTPATIENT
Start: 2020-03-03 | End: 2020-03-06

## 2020-03-03 RX ORDER — FUROSEMIDE 40 MG
40 TABLET ORAL DAILY
Refills: 0 | Status: DISCONTINUED | OUTPATIENT
Start: 2020-03-03 | End: 2020-03-06

## 2020-03-03 RX ORDER — SODIUM CHLORIDE 9 MG/ML
1000 INJECTION, SOLUTION INTRAVENOUS
Refills: 0 | Status: DISCONTINUED | OUTPATIENT
Start: 2020-03-03 | End: 2020-03-04

## 2020-03-03 RX ORDER — CEFAZOLIN SODIUM 1 G
2000 VIAL (EA) INJECTION EVERY 8 HOURS
Refills: 0 | Status: COMPLETED | OUTPATIENT
Start: 2020-03-03 | End: 2020-03-04

## 2020-03-03 RX ORDER — OXYCODONE HYDROCHLORIDE 5 MG/1
5 TABLET ORAL EVERY 4 HOURS
Refills: 0 | Status: DISCONTINUED | OUTPATIENT
Start: 2020-03-03 | End: 2020-03-06

## 2020-03-03 RX ORDER — ISOSORBIDE DINITRATE 5 MG/1
20 TABLET ORAL THREE TIMES A DAY
Refills: 0 | Status: DISCONTINUED | OUTPATIENT
Start: 2020-03-03 | End: 2020-03-04

## 2020-03-03 RX ORDER — HYDRALAZINE HCL 50 MG
1 TABLET ORAL
Qty: 0 | Refills: 0 | DISCHARGE

## 2020-03-03 RX ORDER — CEFAZOLIN SODIUM 1 G
2000 VIAL (EA) INJECTION EVERY 8 HOURS
Refills: 0 | Status: DISCONTINUED | OUTPATIENT
Start: 2020-03-03 | End: 2020-03-03

## 2020-03-03 RX ORDER — SACUBITRIL AND VALSARTAN 24; 26 MG/1; MG/1
1 TABLET, FILM COATED ORAL
Qty: 0 | Refills: 0 | DISCHARGE

## 2020-03-03 RX ORDER — SENNA PLUS 8.6 MG/1
2 TABLET ORAL AT BEDTIME
Refills: 0 | Status: DISCONTINUED | OUTPATIENT
Start: 2020-03-03 | End: 2020-03-06

## 2020-03-03 RX ORDER — ACETAMINOPHEN 500 MG
650 TABLET ORAL EVERY 6 HOURS
Refills: 0 | Status: COMPLETED | OUTPATIENT
Start: 2020-03-03 | End: 2020-03-06

## 2020-03-03 RX ORDER — POVIDONE-IODINE 5 %
1 AEROSOL (ML) TOPICAL ONCE
Refills: 0 | Status: DISCONTINUED | OUTPATIENT
Start: 2020-03-03 | End: 2020-03-06

## 2020-03-03 RX ORDER — HYDRALAZINE HCL 50 MG
50 TABLET ORAL
Refills: 0 | Status: DISCONTINUED | OUTPATIENT
Start: 2020-03-03 | End: 2020-03-06

## 2020-03-03 RX ORDER — BUPIVACAINE 13.3 MG/ML
20 INJECTION, SUSPENSION, LIPOSOMAL INFILTRATION ONCE
Refills: 0 | Status: DISCONTINUED | OUTPATIENT
Start: 2020-03-03 | End: 2020-03-06

## 2020-03-03 RX ORDER — ASPIRIN/CALCIUM CARB/MAGNESIUM 324 MG
81 TABLET ORAL
Refills: 0 | Status: DISCONTINUED | OUTPATIENT
Start: 2020-03-03 | End: 2020-03-06

## 2020-03-03 RX ORDER — ESCITALOPRAM OXALATE 10 MG/1
1 TABLET, FILM COATED ORAL
Qty: 0 | Refills: 0 | DISCHARGE

## 2020-03-03 RX ORDER — ONDANSETRON 8 MG/1
4 TABLET, FILM COATED ORAL EVERY 6 HOURS
Refills: 0 | Status: DISCONTINUED | OUTPATIENT
Start: 2020-03-03 | End: 2020-03-06

## 2020-03-03 RX ORDER — OXYCODONE HYDROCHLORIDE 5 MG/1
10 TABLET ORAL EVERY 4 HOURS
Refills: 0 | Status: DISCONTINUED | OUTPATIENT
Start: 2020-03-03 | End: 2020-03-06

## 2020-03-03 RX ORDER — POLYETHYLENE GLYCOL 3350 17 G/17G
17 POWDER, FOR SOLUTION ORAL DAILY
Refills: 0 | Status: DISCONTINUED | OUTPATIENT
Start: 2020-03-03 | End: 2020-03-06

## 2020-03-03 RX ORDER — ESCITALOPRAM OXALATE 10 MG/1
10 TABLET, FILM COATED ORAL DAILY
Refills: 0 | Status: DISCONTINUED | OUTPATIENT
Start: 2020-03-03 | End: 2020-03-06

## 2020-03-03 RX ADMIN — Medication 650 MILLIGRAM(S): at 19:50

## 2020-03-03 RX ADMIN — Medication 650 MILLIGRAM(S): at 23:40

## 2020-03-03 RX ADMIN — Medication 2000 MILLIGRAM(S): at 19:49

## 2020-03-03 RX ADMIN — OXYCODONE HYDROCHLORIDE 10 MILLIGRAM(S): 5 TABLET ORAL at 19:51

## 2020-03-03 RX ADMIN — Medication 50 MILLIGRAM(S): at 19:49

## 2020-03-03 RX ADMIN — Medication 650 MILLIGRAM(S): at 20:00

## 2020-03-03 RX ADMIN — Medication 81 MILLIGRAM(S): at 19:49

## 2020-03-03 RX ADMIN — OXYCODONE HYDROCHLORIDE 10 MILLIGRAM(S): 5 TABLET ORAL at 20:15

## 2020-03-03 RX ADMIN — OXYCODONE HYDROCHLORIDE 10 MILLIGRAM(S): 5 TABLET ORAL at 23:40

## 2020-03-03 RX ADMIN — ISOSORBIDE DINITRATE 20 MILLIGRAM(S): 5 TABLET ORAL at 23:40

## 2020-03-03 RX ADMIN — CARVEDILOL PHOSPHATE 12.5 MILLIGRAM(S): 80 CAPSULE, EXTENDED RELEASE ORAL at 19:57

## 2020-03-03 NOTE — PHYSICAL THERAPY INITIAL EVALUATION ADULT - ACTIVE RANGE OF MOTION EXAMINATION, REHAB EVAL
R knee flex 0-80/kendy. upper extremity Active ROM was WNL (within normal limits)/Left LE Active ROM was WFL (within functional limits)/deficits as listed below

## 2020-03-03 NOTE — PHYSICAL THERAPY INITIAL EVALUATION ADULT - PERTINENT HX OF CURRENT PROBLEM, REHAB EVAL
64M with right knee pain x 4-5 years worsened over time without improvement. Failed conservative treatments. Denies numbness, tingling. Ambulates with a cane. Pt denies any recent fevers/chills, chest pain, or shortness of breath.

## 2020-03-03 NOTE — PHYSICAL THERAPY INITIAL EVALUATION ADULT - GAIT DEVIATIONS NOTED, PT EVAL
decreased step length/decreased swing-to-stance ratio/decreased bud/increased lateral sway, min assist to turn, mod VCs for sequencing

## 2020-03-03 NOTE — PROGRESS NOTE ADULT - SUBJECTIVE AND OBJECTIVE BOX
Ortho Post Op Check    Procedure: R TKA   Surgeon: James    Pt comfortable without complaints, pain controlled  Denies CP, SOB, N/V, numbness/tingling     Vital Signs Last 24 Hrs  T(C): 36.5 (03-03-20 @ 17:27), Max: 36.5 (03-03-20 @ 17:27)  T(F): 97.7 (03-03-20 @ 17:27), Max: 97.7 (03-03-20 @ 17:27)  HR: 57 (03-03-20 @ 19:36) (48 - 61)  BP: 186/91 (03-03-20 @ 19:36) (147/80 - 186/91)  BP(mean): 116 (03-03-20 @ 16:36) (106 - 118)  RR: 14 (03-03-20 @ 19:36) (12 - 20)  SpO2: 94% (03-03-20 @ 19:36) (94% - 100%)  AVSS    General: Pt Alert and oriented, NAD  R knee DSG C/D/I; cryocuff in place  Sensation intact s/s/sp/dp/t and symmetric   Motor: EHL/FHL/TA/GS 5/5 and symmetric   Toes warm/perfused      Post-op X-Ray: Hardware in place with good alignment     A/P: 64yMale POD#0 s/p R TKA  - Stable  - Pain Control  - DVT ppx: ASA  - Post op abx: ancef periop  - PT, WBS: WBAT  - dispo: pending PT eval     Ortho Pager [phone]

## 2020-03-04 ENCOUNTER — TRANSCRIPTION ENCOUNTER (OUTPATIENT)
Age: 65
End: 2020-03-04

## 2020-03-04 LAB
ANION GAP SERPL CALC-SCNC: 11 MMOL/L — SIGNIFICANT CHANGE UP (ref 5–17)
BUN SERPL-MCNC: 14 MG/DL — SIGNIFICANT CHANGE UP (ref 7–23)
CALCIUM SERPL-MCNC: 9 MG/DL — SIGNIFICANT CHANGE UP (ref 8.4–10.5)
CHLORIDE SERPL-SCNC: 104 MMOL/L — SIGNIFICANT CHANGE UP (ref 96–108)
CO2 SERPL-SCNC: 26 MMOL/L — SIGNIFICANT CHANGE UP (ref 22–31)
CREAT SERPL-MCNC: 1.14 MG/DL — SIGNIFICANT CHANGE UP (ref 0.5–1.3)
GLUCOSE SERPL-MCNC: 145 MG/DL — HIGH (ref 70–99)
HCT VFR BLD CALC: 45.1 % — SIGNIFICANT CHANGE UP (ref 39–50)
HCV AB S/CO SERPL IA: 0.09 S/CO — SIGNIFICANT CHANGE UP
HCV AB SERPL-IMP: SIGNIFICANT CHANGE UP
HGB BLD-MCNC: 14.6 G/DL — SIGNIFICANT CHANGE UP (ref 13–17)
MCHC RBC-ENTMCNC: 30.6 PG — SIGNIFICANT CHANGE UP (ref 27–34)
MCHC RBC-ENTMCNC: 32.4 GM/DL — SIGNIFICANT CHANGE UP (ref 32–36)
MCV RBC AUTO: 94.5 FL — SIGNIFICANT CHANGE UP (ref 80–100)
NRBC # BLD: 0 /100 WBCS — SIGNIFICANT CHANGE UP (ref 0–0)
PLATELET # BLD AUTO: 244 K/UL — SIGNIFICANT CHANGE UP (ref 150–400)
POTASSIUM SERPL-MCNC: 4.6 MMOL/L — SIGNIFICANT CHANGE UP (ref 3.5–5.3)
POTASSIUM SERPL-SCNC: 4.6 MMOL/L — SIGNIFICANT CHANGE UP (ref 3.5–5.3)
RBC # BLD: 4.77 M/UL — SIGNIFICANT CHANGE UP (ref 4.2–5.8)
RBC # FLD: 14.6 % — HIGH (ref 10.3–14.5)
SODIUM SERPL-SCNC: 141 MMOL/L — SIGNIFICANT CHANGE UP (ref 135–145)
WBC # BLD: 11.45 K/UL — HIGH (ref 3.8–10.5)
WBC # FLD AUTO: 11.45 K/UL — HIGH (ref 3.8–10.5)

## 2020-03-04 PROCEDURE — 99223 1ST HOSP IP/OBS HIGH 75: CPT

## 2020-03-04 RX ORDER — OXYCODONE HYDROCHLORIDE 5 MG/1
1 TABLET ORAL
Qty: 0 | Refills: 0 | DISCHARGE
Start: 2020-03-04

## 2020-03-04 RX ORDER — ISOSORBIDE DINITRATE 5 MG/1
20 TABLET ORAL
Refills: 0 | Status: DISCONTINUED | OUTPATIENT
Start: 2020-03-04 | End: 2020-03-06

## 2020-03-04 RX ORDER — GABAPENTIN 400 MG/1
100 CAPSULE ORAL THREE TIMES A DAY
Refills: 0 | Status: DISCONTINUED | OUTPATIENT
Start: 2020-03-04 | End: 2020-03-06

## 2020-03-04 RX ORDER — ACETAMINOPHEN 500 MG
2 TABLET ORAL
Qty: 0 | Refills: 0 | DISCHARGE
Start: 2020-03-04

## 2020-03-04 RX ORDER — POLYETHYLENE GLYCOL 3350 17 G/17G
17 POWDER, FOR SOLUTION ORAL
Qty: 0 | Refills: 0 | DISCHARGE
Start: 2020-03-04

## 2020-03-04 RX ORDER — ASPIRIN/CALCIUM CARB/MAGNESIUM 324 MG
1 TABLET ORAL
Qty: 0 | Refills: 0 | DISCHARGE
Start: 2020-03-04

## 2020-03-04 RX ADMIN — ESCITALOPRAM OXALATE 10 MILLIGRAM(S): 10 TABLET, FILM COATED ORAL at 11:52

## 2020-03-04 RX ADMIN — Medication 650 MILLIGRAM(S): at 17:48

## 2020-03-04 RX ADMIN — Medication 50 MILLIGRAM(S): at 17:49

## 2020-03-04 RX ADMIN — Medication 650 MILLIGRAM(S): at 07:53

## 2020-03-04 RX ADMIN — SACUBITRIL AND VALSARTAN 1 TABLET(S): 24; 26 TABLET, FILM COATED ORAL at 19:39

## 2020-03-04 RX ADMIN — OXYCODONE HYDROCHLORIDE 10 MILLIGRAM(S): 5 TABLET ORAL at 00:10

## 2020-03-04 RX ADMIN — POLYETHYLENE GLYCOL 3350 17 GRAM(S): 17 POWDER, FOR SOLUTION ORAL at 11:52

## 2020-03-04 RX ADMIN — OXYCODONE HYDROCHLORIDE 10 MILLIGRAM(S): 5 TABLET ORAL at 07:29

## 2020-03-04 RX ADMIN — MORPHINE SULFATE 2 MILLIGRAM(S): 50 CAPSULE, EXTENDED RELEASE ORAL at 09:18

## 2020-03-04 RX ADMIN — ISOSORBIDE DINITRATE 20 MILLIGRAM(S): 5 TABLET ORAL at 07:26

## 2020-03-04 RX ADMIN — OXYCODONE HYDROCHLORIDE 10 MILLIGRAM(S): 5 TABLET ORAL at 11:51

## 2020-03-04 RX ADMIN — Medication 40 MILLIGRAM(S): at 07:23

## 2020-03-04 RX ADMIN — MORPHINE SULFATE 2 MILLIGRAM(S): 50 CAPSULE, EXTENDED RELEASE ORAL at 09:42

## 2020-03-04 RX ADMIN — Medication 650 MILLIGRAM(S): at 00:10

## 2020-03-04 RX ADMIN — Medication 2000 MILLIGRAM(S): at 04:00

## 2020-03-04 RX ADMIN — Medication 650 MILLIGRAM(S): at 18:30

## 2020-03-04 RX ADMIN — Medication 81 MILLIGRAM(S): at 17:48

## 2020-03-04 RX ADMIN — Medication 650 MILLIGRAM(S): at 11:52

## 2020-03-04 RX ADMIN — Medication 81 MILLIGRAM(S): at 07:23

## 2020-03-04 RX ADMIN — CARVEDILOL PHOSPHATE 12.5 MILLIGRAM(S): 80 CAPSULE, EXTENDED RELEASE ORAL at 17:48

## 2020-03-04 RX ADMIN — SACUBITRIL AND VALSARTAN 1 TABLET(S): 24; 26 TABLET, FILM COATED ORAL at 07:23

## 2020-03-04 RX ADMIN — CARVEDILOL PHOSPHATE 12.5 MILLIGRAM(S): 80 CAPSULE, EXTENDED RELEASE ORAL at 07:24

## 2020-03-04 RX ADMIN — Medication 50 MILLIGRAM(S): at 07:23

## 2020-03-04 RX ADMIN — OXYCODONE HYDROCHLORIDE 10 MILLIGRAM(S): 5 TABLET ORAL at 12:47

## 2020-03-04 RX ADMIN — GABAPENTIN 100 MILLIGRAM(S): 400 CAPSULE ORAL at 13:42

## 2020-03-04 RX ADMIN — Medication 650 MILLIGRAM(S): at 12:47

## 2020-03-04 RX ADMIN — GABAPENTIN 100 MILLIGRAM(S): 400 CAPSULE ORAL at 21:41

## 2020-03-04 RX ADMIN — OXYCODONE HYDROCHLORIDE 10 MILLIGRAM(S): 5 TABLET ORAL at 09:00

## 2020-03-04 RX ADMIN — Medication 650 MILLIGRAM(S): at 07:23

## 2020-03-04 RX ADMIN — ISOSORBIDE DINITRATE 20 MILLIGRAM(S): 5 TABLET ORAL at 17:50

## 2020-03-04 NOTE — DISCHARGE NOTE PROVIDER - NSDCFUADDINST_GEN_ALL_CORE_FT
Weight bear as tolerated   No strenuous activity, heavy lifting, driving or returning to work until cleared by MD.  You may shower - dressing is water-resistant, no soaking in bathtubs.  Keep dressing on your knee until the follow up appointment.  Try to have regular bowel movements, take stool softener or laxative if necessary.  May take Pepcid or Zantac for upset stomach.  Swelling may travel all the way down leg to foot, this is normal and will subside in a few weeks.  Call to schedule an appt with Dr. Ramirez for follow up.    Contact your doctor if you experience: fever greater than 101.5, chills, chest pain, difficulty breathing, redness or excessive drainage around the incision, other concerns.  Follow up with your primary care provider.

## 2020-03-04 NOTE — CONSULT NOTE ADULT - SUBJECTIVE AND OBJECTIVE BOX
Late Entry - pt seen at 1215h    64M w HTN, CHF (hypertensive CM w unknown EF), R knee OA, current smoker (2 cig/d) here for elective RIGHT TKA w Dr. Ramirez 3/3 now POD1    Pt hypertensive this AM. Received his hypertensive agents (coreg 12.5 BID, entresto, lasix 40, hydralazine 50 BID, isosorbide dintirate 20 BID). States baseline BPs are "high." and that he does take his medications regularly but does not measure BPs at home. States there is pain in R knee around incision site wo numbness or radiation. Worse w movement. Denies any chest pain or dyspnea. No fever, nausea, abd pain, dysuria. +Flatus but no BM.     ROS; 12 point ROS reviewed and negative  FHx: denies h/o DM, HTN  SHx: smokes 2 cigarettes daily up to sx; infrequent EtOH - previous heavy drinker      PAST MEDICAL & SURGICAL HISTORY:  Dyspnea  Alcohol abuse  Sinus tachycardia  Anxiety and depression  AV block, 1st degree  Mitral regurgitation  CHF (congestive heart failure)  Hypertensive cardiomyopathy  Osteoarthritis: right knee  History of hemorrhoidectomy    Home Meds: Home Medications:  acetaminophen 325 mg oral tablet: 2 tab(s) orally every 6 hours (04 Mar 2020 11:46)  aspirin 81 mg oral delayed release tablet: 1 tab(s) orally 2 times a day (04 Mar 2020 11:46)  carvedilol 12.5 mg oral tablet: 1 tab(s) orally 2 times a day (03 Mar 2020 09:07)  Entresto 49 mg-51 mg oral tablet: 1 tab(s) orally 2 times a day (03 Mar 2020 09:07)  escitalopram 10 mg oral tablet: 1 tab(s) orally once a day (03 Mar 2020 09:07)  furosemide 40 mg oral tablet: 1 tab(s) orally once a day (03 Mar 2020 09:07)  hydrALAZINE 50 mg oral tablet: 1 tab(s) orally 2 times a day (03 Mar 2020 09:07)  isosorbide dinitrate 20 mg oral tablet: 1 tab(s) orally 2 times a day (03 Mar 2020 09:07)  oxyCODONE 10 mg oral tablet: 1 tab(s) orally every 4 hours, As needed, Severe Pain (7 - 10) (04 Mar 2020 11:46)  oxyCODONE 5 mg oral tablet: 1 tab(s) orally every 4 hours, As needed, Moderate Pain (4 - 6) (04 Mar 2020 11:46)  polyethylene glycol 3350 oral powder for reconstitution: 17 gram(s) orally once a day (04 Mar 2020 11:46)    Allergies: Allergies    No Known Allergies    Intolerances      Soc:   Advanced Directives: Presumed Full Code     CURRENT MEDICATIONS:   --------------------------------------------------------------------------------------  Neurologic Medications  acetaminophen   Tablet .. 650 milliGRAM(s) Oral every 6 hours  escitalopram 10 milliGRAM(s) Oral daily  gabapentin 100 milliGRAM(s) Oral three times a day  morphine  - Injectable 2 milliGRAM(s) IV Push every 3 hours PRN breakthrough pain  ondansetron Injectable 4 milliGRAM(s) IV Push every 6 hours PRN Nausea and/or Vomiting  oxyCODONE    IR 5 milliGRAM(s) Oral every 4 hours PRN Moderate Pain (4 - 6)  oxyCODONE    IR 10 milliGRAM(s) Oral every 4 hours PRN Severe Pain (7 - 10)    Respiratory Medications    Cardiovascular Medications  carvedilol 12.5 milliGRAM(s) Oral every 12 hours  furosemide    Tablet 40 milliGRAM(s) Oral daily  hydrALAZINE 50 milliGRAM(s) Oral two times a day  isosorbide   dinitrate Tablet (ISORDIL) 20 milliGRAM(s) Oral two times a day  sacubitril 49 mG/valsartan 51 mG 1 Tablet(s) Oral two times a day    Gastrointestinal Medications  aluminum hydroxide/magnesium hydroxide/simethicone Suspension 30 milliLiter(s) Oral four times a day PRN Indigestion  polyethylene glycol 3350 17 Gram(s) Oral daily  senna 2 Tablet(s) Oral at bedtime PRN Constipation    Genitourinary Medications    Hematologic/Oncologic Medications  aspirin enteric coated 81 milliGRAM(s) Oral two times a day    Antimicrobial/Immunologic Medications    Endocrine/Metabolic Medications    Topical/Other Medications  BUpivacaine liposome 1.3% Injectable (no eMAR) 20 milliLiter(s) Local Injection once  chlorhexidine 2% Cloths 1 Application(s) Topical once  povidone iodine 5% Nasal Swab 1 Application(s) Both Nostrils once    --------------------------------------------------------------------------------------    VITAL SIGNS, INS/OUTS (last 24 hours):  --------------------------------------------------------------------------------------  ICU Vital Signs Last 24 Hrs  T(C): 36.8 (04 Mar 2020 17:01), Max: 36.8 (04 Mar 2020 09:15)  T(F): 98.3 (04 Mar 2020 17:01), Max: 98.3 (04 Mar 2020 09:15)  HR: 63 (04 Mar 2020 17:01) (63 - 80)  BP: 125/74 (04 Mar 2020 17:01) (125/74 - 191/106)  BP(mean): 135 (04 Mar 2020 06:00) (121 - 135)  ABP: --  ABP(mean): --  RR: 20 (04 Mar 2020 17:01) (11 - 20)  SpO2: 93% (04 Mar 2020 17:01) (93% - 98%)    I&O's Summary    03 Mar 2020 07:01  -  04 Mar 2020 07:00  --------------------------------------------------------  IN: 420 mL / OUT: 900 mL / NET: -480 mL    04 Mar 2020 07:01  -  04 Mar 2020 20:56  --------------------------------------------------------  IN: 1750 mL / OUT: 400 mL / NET: 1350 mL      --------------------------------------------------------------------------------------    EXAM:  GEN: AA Male in NAD on RA  HEENT: NC/AT, MMM  NECK: Supple  CV: RRR, no murmurs, no BLE edema, SCDs in place  PULM: nml effort,CTAB  ABD: Soft, NABS, non-tender  NEURO: alert, conversant, moving all ext  3/5 in R hip and knee. Otherwise 5/5, sensory intact, EOMI  SKIN: cryocuff on R knee. dressing c/d/i, minimal swelling surrounding site  Psych: appropriate    LABS  --------------------------------------------------------------------------------------  Labs:  CAPILLARY BLOOD GLUCOSE                              14.6   11.45 )-----------( 244      ( 04 Mar 2020 08:00 )             45.1         03-04    141  |  104  |  14  ----------------------------<  145<H>  4.6   |  26  |  1.14      Calcium, Total Serum: 9.0 mg/dL (03-04-20 @ 08:00)      LFTs:         Coags:                  --------------------------------------------------------------------------------------    OTHER LABS    IMAGING RESULTS  ****************

## 2020-03-04 NOTE — PROGRESS NOTE ADULT - SUBJECTIVE AND OBJECTIVE BOX
S: Patient seen and examined. Doing well this AM. Pain reported but controlled. No acute events overnight.  HTN overnight, stable    O:   Vital Signs Last 24 Hrs  T(C): 36.8 (04 Mar 2020 17:01), Max: 36.8 (04 Mar 2020 09:15)  T(F): 98.3 (04 Mar 2020 17:01), Max: 98.3 (04 Mar 2020 09:15)  HR: 63 (04 Mar 2020 17:01) (63 - 80)  BP: 125/74 (04 Mar 2020 17:01) (125/74 - 191/106)  BP(mean): 135 (04 Mar 2020 06:00) (121 - 135)  RR: 20 (04 Mar 2020 17:01) (11 - 20)  SpO2: 93% (04 Mar 2020 17:01) (93% - 98%)    Motor: 5/5 GS/TA/EHL/FHL BL   SILT to patients baseline BL  WWP DP PT BL  Dressing C/D/I

## 2020-03-04 NOTE — CONSULT NOTE ADULT - I WAS PHYSICALLY PRESENT FOR THE KEY PORTIONS OF THE EVALUATION AND MANAGEMENT (E/M) SERVICE PROVIDED.  I AGREE WITH THE ABOVE HISTORY, PHYSICAL, AND PLAN WHICH I HAVE REVIEWED AND EDITED WHERE APPROPRIATE
January 13, 2020      [facility]  [address], [address]  [place] [address]  Phone: [phone]  Fax: [phone]       Patient: Naomy Armstrong   YOB: 1964  Date of Visit: 01/13/2020    To Whom It May Concern:    Ronald Armstrong  was at [facility] on 01/13/2020.  If you have any questions or concerns, or if I can be of further assistance, please do not hesitate to contact me.    Sincerely,    Carlene Narayan RN      Statement Selected

## 2020-03-04 NOTE — CONSULT NOTE ADULT - ASSESSMENT
64M w HTN, CHF (hypertensive CM w unknown EF), R knee OA, current smoker (2 cig/d) here for elective RIGHT TKA w Dr. Ramirez 3/3 now POD1    #Post-operative state - pain managed. c/w bowel regimen, c/w IS, PPx on ASA BID, PT pending KAY  #Hypertension - Above target today likely exacerbated by pain. Continued on home regimen  #Hypertensive cardiomyopathy - unknown EF when searching in HIE and chart. Appears euvolemic at this time. Appears euvolemic at this time and not in acute HF exacerbation. c/w coreg, lasix 40  #Mitral Regurgitation - seen on outpt summary, chronic. Appears stable at this time  #Depression - on lexapro  #PreDM - a1C pre-op was 6.   #CKD2 - Cr 1.2 pre-op w/ GFR 62 for AA. Likely exacerbated by uncontrolled HTN  #Obesity - BMI 33.9. Outpt f/u  #Leukocytosis - 11.4 this AM. likley reactive from post-op. No fevers    Recommendations   - continue monitor BPs. Toward target <140. Optimized pain and titrate BP medications as necessary while preventing orthostasis w PT   - BMP in AM   - PT dispo recommending KAY

## 2020-03-04 NOTE — PROGRESS NOTE ADULT - SUBJECTIVE AND OBJECTIVE BOX
Ortho Note    Pt seen and examined after PT. Pain tolerable at present.  Reports some SOB with PT, but states this is his baseline, and   often experiences DUKE at home.  Denies CP, SOB, N/V, numbness/tingling     Vital Signs Last 24 Hrs  T(C): 36.8 (03-04-20 @ 09:15), Max: 36.8 (03-04-20 @ 09:15)  T(F): 98.3 (03-04-20 @ 09:15), Max: 98.3 (03-04-20 @ 09:15)  HR: 78 (03-04-20 @ 09:36) (63 - 78)  BP: 166/83 (03-04-20 @ 09:36) (154/78 - 189/97)  BP(mean): 135 (03-04-20 @ 06:00) (135 - 135)  RR: 18 (03-04-20 @ 09:36) (18 - 18)  SpO2: 95% (03-04-20 @ 09:36) (95% - 98%)  AVSS    focused exam:  General: Pt Alert and oriented, NAD  DSG C/D/I  Pulses: +2DP, WWP feet  Sensation: SILT BLE  Motor: 5/5 EHL/FHL/TA/GS                          14.6   11.45 )-----------( 244      ( 04 Mar 2020 08:00 )             45.1   04 Mar 2020 08:00    141    |  104    |  14     ----------------------------<  145    4.6     |  26     |  1.14     Ca    9.0        04 Mar 2020 08:00        A/P: 64yMale POD#1 s/p right total knee replacement  - hypertensive overnight (asymptomatic), likely r/t pain/ not taking meds, improved with home regimen given this AM  - DUKE- no wheezing, dizziness; reports at baseline; frequent breaks in activity encouraged; appreciate medicine recs + continue home meds  - Pain Control  - DVT ppx: ASA bid  - PT, WBS: WBAT  - OOB for meals, I/S  - bowel regimen  - dispo: KAY    Ortho Pager [phone]

## 2020-03-04 NOTE — DISCHARGE NOTE PROVIDER - CARE PROVIDER_API CALL
Nick Ramirez)  Orthopaedic Surgery  [address]  [place], [place] [address]  Phone: [phone]  Fax: [phone]  Follow Up Time:

## 2020-03-04 NOTE — DISCHARGE NOTE PROVIDER - NSDCMRMEDTOKEN_GEN_ALL_CORE_FT
carvedilol 12.5 mg oral tablet: 1 tab(s) orally 2 times a day  Entresto 49 mg-51 mg oral tablet: 1 tab(s) orally 2 times a day  escitalopram 10 mg oral tablet: 1 tab(s) orally once a day  furosemide 40 mg oral tablet: 1 tab(s) orally once a day  hydrALAZINE 50 mg oral tablet: 1 tab(s) orally 2 times a day  isosorbide dinitrate 20 mg oral tablet: 1 tab(s) orally 2 times a day acetaminophen 325 mg oral tablet: 2 tab(s) orally every 6 hours  aspirin 81 mg oral delayed release tablet: 1 tab(s) orally 2 times a day  carvedilol 12.5 mg oral tablet: 1 tab(s) orally 2 times a day  Entresto 49 mg-51 mg oral tablet: 1 tab(s) orally 2 times a day  escitalopram 10 mg oral tablet: 1 tab(s) orally once a day  furosemide 40 mg oral tablet: 1 tab(s) orally once a day  hydrALAZINE 50 mg oral tablet: 1 tab(s) orally 2 times a day  isosorbide dinitrate 20 mg oral tablet: 1 tab(s) orally 2 times a day  oxyCODONE 10 mg oral tablet: 1 tab(s) orally every 4 hours, As needed, Severe Pain (7 - 10)  oxyCODONE 5 mg oral tablet: 1 tab(s) orally every 4 hours, As needed, Moderate Pain (4 - 6)  polyethylene glycol 3350 oral powder for reconstitution: 17 gram(s) orally once a day

## 2020-03-04 NOTE — PROGRESS NOTE ADULT - ASSESSMENT
A/P: 64y s/p R TKA  -stable  -pain control  -PT/WBAT  -diet as tolerated  -f/u labs  -disposition: TBD  -discussed with attending

## 2020-03-05 LAB
ANION GAP SERPL CALC-SCNC: 9 MMOL/L — SIGNIFICANT CHANGE UP (ref 5–17)
BUN SERPL-MCNC: 17 MG/DL — SIGNIFICANT CHANGE UP (ref 7–23)
CALCIUM SERPL-MCNC: 8.5 MG/DL — SIGNIFICANT CHANGE UP (ref 8.4–10.5)
CHLORIDE SERPL-SCNC: 100 MMOL/L — SIGNIFICANT CHANGE UP (ref 96–108)
CO2 SERPL-SCNC: 27 MMOL/L — SIGNIFICANT CHANGE UP (ref 22–31)
CREAT SERPL-MCNC: 1.19 MG/DL — SIGNIFICANT CHANGE UP (ref 0.5–1.3)
GLUCOSE SERPL-MCNC: 135 MG/DL — HIGH (ref 70–99)
HCT VFR BLD CALC: 38.5 % — LOW (ref 39–50)
HGB BLD-MCNC: 12.4 G/DL — LOW (ref 13–17)
MCHC RBC-ENTMCNC: 30.6 PG — SIGNIFICANT CHANGE UP (ref 27–34)
MCHC RBC-ENTMCNC: 32.2 GM/DL — SIGNIFICANT CHANGE UP (ref 32–36)
MCV RBC AUTO: 95.1 FL — SIGNIFICANT CHANGE UP (ref 80–100)
NRBC # BLD: 0 /100 WBCS — SIGNIFICANT CHANGE UP (ref 0–0)
PLATELET # BLD AUTO: 211 K/UL — SIGNIFICANT CHANGE UP (ref 150–400)
POTASSIUM SERPL-MCNC: 4 MMOL/L — SIGNIFICANT CHANGE UP (ref 3.5–5.3)
POTASSIUM SERPL-SCNC: 4 MMOL/L — SIGNIFICANT CHANGE UP (ref 3.5–5.3)
RBC # BLD: 4.05 M/UL — LOW (ref 4.2–5.8)
RBC # FLD: 15 % — HIGH (ref 10.3–14.5)
SODIUM SERPL-SCNC: 136 MMOL/L — SIGNIFICANT CHANGE UP (ref 135–145)
WBC # BLD: 10.87 K/UL — HIGH (ref 3.8–10.5)
WBC # FLD AUTO: 10.87 K/UL — HIGH (ref 3.8–10.5)

## 2020-03-05 PROCEDURE — 99233 SBSQ HOSP IP/OBS HIGH 50: CPT

## 2020-03-05 RX ADMIN — OXYCODONE HYDROCHLORIDE 10 MILLIGRAM(S): 5 TABLET ORAL at 08:48

## 2020-03-05 RX ADMIN — GABAPENTIN 100 MILLIGRAM(S): 400 CAPSULE ORAL at 13:50

## 2020-03-05 RX ADMIN — POLYETHYLENE GLYCOL 3350 17 GRAM(S): 17 POWDER, FOR SOLUTION ORAL at 11:10

## 2020-03-05 RX ADMIN — OXYCODONE HYDROCHLORIDE 10 MILLIGRAM(S): 5 TABLET ORAL at 13:50

## 2020-03-05 RX ADMIN — Medication 650 MILLIGRAM(S): at 06:30

## 2020-03-05 RX ADMIN — GABAPENTIN 100 MILLIGRAM(S): 400 CAPSULE ORAL at 05:38

## 2020-03-05 RX ADMIN — SACUBITRIL AND VALSARTAN 1 TABLET(S): 24; 26 TABLET, FILM COATED ORAL at 18:26

## 2020-03-05 RX ADMIN — Medication 50 MILLIGRAM(S): at 18:26

## 2020-03-05 RX ADMIN — OXYCODONE HYDROCHLORIDE 10 MILLIGRAM(S): 5 TABLET ORAL at 14:35

## 2020-03-05 RX ADMIN — SACUBITRIL AND VALSARTAN 1 TABLET(S): 24; 26 TABLET, FILM COATED ORAL at 05:38

## 2020-03-05 RX ADMIN — Medication 81 MILLIGRAM(S): at 05:38

## 2020-03-05 RX ADMIN — Medication 650 MILLIGRAM(S): at 11:10

## 2020-03-05 RX ADMIN — Medication 40 MILLIGRAM(S): at 05:38

## 2020-03-05 RX ADMIN — Medication 650 MILLIGRAM(S): at 18:26

## 2020-03-05 RX ADMIN — ESCITALOPRAM OXALATE 10 MILLIGRAM(S): 10 TABLET, FILM COATED ORAL at 11:10

## 2020-03-05 RX ADMIN — Medication 650 MILLIGRAM(S): at 01:03

## 2020-03-05 RX ADMIN — Medication 650 MILLIGRAM(S): at 02:00

## 2020-03-05 RX ADMIN — Medication 50 MILLIGRAM(S): at 05:38

## 2020-03-05 RX ADMIN — GABAPENTIN 100 MILLIGRAM(S): 400 CAPSULE ORAL at 21:00

## 2020-03-05 RX ADMIN — Medication 650 MILLIGRAM(S): at 12:39

## 2020-03-05 RX ADMIN — Medication 81 MILLIGRAM(S): at 18:26

## 2020-03-05 RX ADMIN — CARVEDILOL PHOSPHATE 12.5 MILLIGRAM(S): 80 CAPSULE, EXTENDED RELEASE ORAL at 18:26

## 2020-03-05 RX ADMIN — ISOSORBIDE DINITRATE 20 MILLIGRAM(S): 5 TABLET ORAL at 18:26

## 2020-03-05 RX ADMIN — OXYCODONE HYDROCHLORIDE 10 MILLIGRAM(S): 5 TABLET ORAL at 20:58

## 2020-03-05 RX ADMIN — OXYCODONE HYDROCHLORIDE 10 MILLIGRAM(S): 5 TABLET ORAL at 09:32

## 2020-03-05 RX ADMIN — Medication 650 MILLIGRAM(S): at 18:27

## 2020-03-05 RX ADMIN — OXYCODONE HYDROCHLORIDE 10 MILLIGRAM(S): 5 TABLET ORAL at 19:39

## 2020-03-05 RX ADMIN — CARVEDILOL PHOSPHATE 12.5 MILLIGRAM(S): 80 CAPSULE, EXTENDED RELEASE ORAL at 05:38

## 2020-03-05 RX ADMIN — ISOSORBIDE DINITRATE 20 MILLIGRAM(S): 5 TABLET ORAL at 05:38

## 2020-03-05 RX ADMIN — Medication 650 MILLIGRAM(S): at 05:39

## 2020-03-05 NOTE — PROVIDER CONTACT NOTE (OTHER) - ACTION/TREATMENT ORDERED:
MD made aware. MD ordered to start IVF as ordered. MD made aware. No new orders just continue to monitor patient.

## 2020-03-05 NOTE — PROGRESS NOTE ADULT - SUBJECTIVE AND OBJECTIVE BOX
S: Patient seen and examined. Doing well this AM. Pain reported but controlled. No acute events overnight.  HTN overnight, stable. HTN this AM, discussed with nursing. Home meds given    O:   Vital Signs Last 24 Hrs  T(C): 36.9 (05 Mar 2020 05:30), Max: 36.9 (05 Mar 2020 01:00)  T(F): 98.5 (05 Mar 2020 05:30), Max: 98.5 (05 Mar 2020 05:30)  HR: 66 (05 Mar 2020 07:28) (57 - 80)  BP: 137/78 (05 Mar 2020 07:28) (125/74 - 183/92)  BP(mean): --  RR: 20 (05 Mar 2020 05:30) (16 - 20)  SpO2: 94% (05 Mar 2020 05:30) (93% - 96%)    Motor: 5/5 GS/TA/EHL/FHL BL   SILT to patients baseline BL  WWP DP PT BL  Dressing C/D/I

## 2020-03-05 NOTE — PROGRESS NOTE ADULT - SUBJECTIVE AND OBJECTIVE BOX
INTERVAL HPI/OVERNIGHT EVENTS: MARLEN O/N    SUBJECTIVE: Patient seen and examined at bedside.   Pt states pain in R hip is improved w pain medication. No numbness, chest pain    ROS: 12 point ROS reviewed and negative    OBJECTIVE:    VITAL SIGNS:  ICU Vital Signs Last 24 Hrs  T(C): 37.3 (05 Mar 2020 08:31), Max: 37.3 (05 Mar 2020 08:31)  T(F): 99.2 (05 Mar 2020 08:31), Max: 99.2 (05 Mar 2020 08:31)  HR: 79 (05 Mar 2020 08:31) (57 - 79)  BP: 129/79 (05 Mar 2020 08:31) (125/74 - 183/92)  BP(mean): --  ABP: --  ABP(mean): --  RR: 19 (05 Mar 2020 08:31) (16 - 20)  SpO2: 95% (05 Mar 2020 08:31) (93% - 95%)        03-04 @ 07:01  -  03-05 @ 07:00  --------------------------------------------------------  IN: 2224 mL / OUT: 900 mL / NET: 1324 mL    03-05 @ 07:01  -  03-05 @ 12:29  --------------------------------------------------------  IN: 0 mL / OUT: 300 mL / NET: -300 mL      CAPILLARY BLOOD GLUCOSE          PHYSICAL EXAM:    General: NAD  HEENT: NC/AT; PERRL, clear conjunctiva  Neck: supple  Respiratory: CTA b/l  Cardiovascular: +S1/S2; RRR  Abdomen: soft, NT/ND; +BS x4  Extremities: WWP, 2+ peripheral pulses b/l; no LE edema  Skin: normal color and turgor; no rash  Neurological:     MEDICATIONS:  MEDICATIONS  (STANDING):  acetaminophen   Tablet .. 650 milliGRAM(s) Oral every 6 hours  aspirin enteric coated 81 milliGRAM(s) Oral two times a day  BUpivacaine liposome 1.3% Injectable (no eMAR) 20 milliLiter(s) Local Injection once  carvedilol 12.5 milliGRAM(s) Oral every 12 hours  chlorhexidine 2% Cloths 1 Application(s) Topical once  escitalopram 10 milliGRAM(s) Oral daily  furosemide    Tablet 40 milliGRAM(s) Oral daily  gabapentin 100 milliGRAM(s) Oral three times a day  hydrALAZINE 50 milliGRAM(s) Oral two times a day  isosorbide   dinitrate Tablet (ISORDIL) 20 milliGRAM(s) Oral two times a day  polyethylene glycol 3350 17 Gram(s) Oral daily  povidone iodine 5% Nasal Swab 1 Application(s) Both Nostrils once  sacubitril 49 mG/valsartan 51 mG 1 Tablet(s) Oral two times a day    MEDICATIONS  (PRN):  aluminum hydroxide/magnesium hydroxide/simethicone Suspension 30 milliLiter(s) Oral four times a day PRN Indigestion  bisacodyl Suppository 10 milliGRAM(s) Rectal daily PRN If no bowel movement by POD#2  morphine  - Injectable 2 milliGRAM(s) IV Push every 3 hours PRN breakthrough pain  ondansetron Injectable 4 milliGRAM(s) IV Push every 6 hours PRN Nausea and/or Vomiting  oxyCODONE    IR 5 milliGRAM(s) Oral every 4 hours PRN Moderate Pain (4 - 6)  oxyCODONE    IR 10 milliGRAM(s) Oral every 4 hours PRN Severe Pain (7 - 10)  senna 2 Tablet(s) Oral at bedtime PRN Constipation      ALLERGIES:  Allergies    No Known Allergies    Intolerances        LABS:                        14.6   11.45 )-----------( 244      ( 04 Mar 2020 08:00 )             45.1     03-04    141  |  104  |  14  ----------------------------<  145<H>  4.6   |  26  |  1.14    Ca    9.0      04 Mar 2020 08:00            RADIOLOGY & ADDITIONAL TESTS: Reviewed.

## 2020-03-05 NOTE — PROGRESS NOTE ADULT - SUBJECTIVE AND OBJECTIVE BOX
Ortho Note    Pt seen and examined. Comfortable without complaints, pain controlled  Denies CP, SOB, N/V, numbness/tingling.    Vital Signs Last 24 Hrs  T(C): 37.3 (03-05-20 @ 08:31), Max: 37.3 (03-05-20 @ 08:31)  T(F): 99.2 (03-05-20 @ 08:31), Max: 99.2 (03-05-20 @ 08:31)  HR: 79 (03-05-20 @ 08:31) (66 - 79)  BP: 129/79 (03-05-20 @ 08:31) (129/79 - 183/92)  BP(mean): --  RR: 19 (03-05-20 @ 08:31) (19 - 20)  SpO2: 95% (03-05-20 @ 08:31) (94% - 95%)  AVSS    focused exam:  General: Pt Alert and oriented, NAD  DSG C/D/I  Pulses: +2DP, WWP feet  Sensation: SILT BLE  Motor: 5/5 EHL/FHL/TA/GS                          14.6   11.45 )-----------( 244      ( 04 Mar 2020 08:00 )             45.1   04 Mar 2020 08:00    141    |  104    |  14     ----------------------------<  145    4.6     |  26     |  1.14           A/P: 64yMale POD#1 s/p right total knee replacement  - Stable  - Pain Control  - DVT ppx: ASA bid  - PT, WBS: WBAT  - continue bowel regimen  - OOB for meals, I/S  - dispo: KAY    Ortho Pager [phone]

## 2020-03-05 NOTE — PROGRESS NOTE ADULT - ASSESSMENT
64M w HTN, CHF (hypertensive CM w unknown EF), R knee OA, current smoker (2 cig/d) here for elective RIGHT TKA w Dr. Ramirez 3/3 now POD2    #Post-operative state - pain managed. c/w bowel regimen, c/w IS, PPx on ASA BID, PT pending [facility]AY  #Hypertension - Improved today - Continued on home regimen  #Hypertensive cardiomyopathy - unknown EF when searching in HIE and chart. Appears euvolemic at this time. Appears euvolemic at this time and not in acute HF exacerbation. c/w coreg, lasix 40  #Mitral Regurgitation - seen on outpt summary, chronic. Appears stable at this time  #Depression - on lexapro  #PreDM - a1C pre-op was 6.   #CKD2 - No new labs today. Cr 1.2 pre-op w/ GFR 62 for AA. Likely exacerbated by uncontrolled HTN  #Obesity - BMI 33.9. Outpt f/u  #Leukocytosis - No new labs today. 11.4 post-op. Afebrile. Low suspicion for infection at this time. Likely reactive from post-op. No fevers    Recommendations   - CBC w BMP   - Pt expressing depressive sxs; to discuss further w pt, ortho PA and CM/SW regarding home environment and support system   - PT dispo recommending KAY

## 2020-03-06 ENCOUNTER — TRANSCRIPTION ENCOUNTER (OUTPATIENT)
Age: 65
End: 2020-03-06

## 2020-03-06 VITALS
RESPIRATION RATE: 17 BRPM | TEMPERATURE: 98 F | HEART RATE: 79 BPM | OXYGEN SATURATION: 97 % | SYSTOLIC BLOOD PRESSURE: 151 MMHG | DIASTOLIC BLOOD PRESSURE: 86 MMHG

## 2020-03-06 LAB
ANION GAP SERPL CALC-SCNC: 9 MMOL/L — SIGNIFICANT CHANGE UP (ref 5–17)
BUN SERPL-MCNC: 18 MG/DL — SIGNIFICANT CHANGE UP (ref 7–23)
CALCIUM SERPL-MCNC: 8.3 MG/DL — LOW (ref 8.4–10.5)
CHLORIDE SERPL-SCNC: 101 MMOL/L — SIGNIFICANT CHANGE UP (ref 96–108)
CO2 SERPL-SCNC: 25 MMOL/L — SIGNIFICANT CHANGE UP (ref 22–31)
CREAT SERPL-MCNC: 1.06 MG/DL — SIGNIFICANT CHANGE UP (ref 0.5–1.3)
GLUCOSE SERPL-MCNC: 107 MG/DL — HIGH (ref 70–99)
HCT VFR BLD CALC: 36.6 % — LOW (ref 39–50)
HGB BLD-MCNC: 11.9 G/DL — LOW (ref 13–17)
MCHC RBC-ENTMCNC: 30.7 PG — SIGNIFICANT CHANGE UP (ref 27–34)
MCHC RBC-ENTMCNC: 32.5 GM/DL — SIGNIFICANT CHANGE UP (ref 32–36)
MCV RBC AUTO: 94.6 FL — SIGNIFICANT CHANGE UP (ref 80–100)
NRBC # BLD: 0 /100 WBCS — SIGNIFICANT CHANGE UP (ref 0–0)
PLATELET # BLD AUTO: 201 K/UL — SIGNIFICANT CHANGE UP (ref 150–400)
POTASSIUM SERPL-MCNC: 4.4 MMOL/L — SIGNIFICANT CHANGE UP (ref 3.5–5.3)
POTASSIUM SERPL-SCNC: 4.4 MMOL/L — SIGNIFICANT CHANGE UP (ref 3.5–5.3)
RBC # BLD: 3.87 M/UL — LOW (ref 4.2–5.8)
RBC # FLD: 14.9 % — HIGH (ref 10.3–14.5)
SODIUM SERPL-SCNC: 135 MMOL/L — SIGNIFICANT CHANGE UP (ref 135–145)
WBC # BLD: 8.62 K/UL — SIGNIFICANT CHANGE UP (ref 3.8–10.5)
WBC # FLD AUTO: 8.62 K/UL — SIGNIFICANT CHANGE UP (ref 3.8–10.5)

## 2020-03-06 PROCEDURE — 99233 SBSQ HOSP IP/OBS HIGH 50: CPT

## 2020-03-06 RX ADMIN — Medication 650 MILLIGRAM(S): at 05:40

## 2020-03-06 RX ADMIN — OXYCODONE HYDROCHLORIDE 10 MILLIGRAM(S): 5 TABLET ORAL at 06:34

## 2020-03-06 RX ADMIN — Medication 650 MILLIGRAM(S): at 02:00

## 2020-03-06 RX ADMIN — GABAPENTIN 100 MILLIGRAM(S): 400 CAPSULE ORAL at 05:39

## 2020-03-06 RX ADMIN — SACUBITRIL AND VALSARTAN 1 TABLET(S): 24; 26 TABLET, FILM COATED ORAL at 17:54

## 2020-03-06 RX ADMIN — Medication 650 MILLIGRAM(S): at 00:39

## 2020-03-06 RX ADMIN — Medication 650 MILLIGRAM(S): at 13:50

## 2020-03-06 RX ADMIN — OXYCODONE HYDROCHLORIDE 10 MILLIGRAM(S): 5 TABLET ORAL at 19:20

## 2020-03-06 RX ADMIN — OXYCODONE HYDROCHLORIDE 10 MILLIGRAM(S): 5 TABLET ORAL at 18:57

## 2020-03-06 RX ADMIN — Medication 650 MILLIGRAM(S): at 13:14

## 2020-03-06 RX ADMIN — Medication 50 MILLIGRAM(S): at 17:54

## 2020-03-06 RX ADMIN — CARVEDILOL PHOSPHATE 12.5 MILLIGRAM(S): 80 CAPSULE, EXTENDED RELEASE ORAL at 05:39

## 2020-03-06 RX ADMIN — ISOSORBIDE DINITRATE 20 MILLIGRAM(S): 5 TABLET ORAL at 17:54

## 2020-03-06 RX ADMIN — ISOSORBIDE DINITRATE 20 MILLIGRAM(S): 5 TABLET ORAL at 05:39

## 2020-03-06 RX ADMIN — SACUBITRIL AND VALSARTAN 1 TABLET(S): 24; 26 TABLET, FILM COATED ORAL at 05:39

## 2020-03-06 RX ADMIN — Medication 81 MILLIGRAM(S): at 05:39

## 2020-03-06 RX ADMIN — Medication 81 MILLIGRAM(S): at 17:54

## 2020-03-06 RX ADMIN — Medication 50 MILLIGRAM(S): at 05:39

## 2020-03-06 RX ADMIN — POLYETHYLENE GLYCOL 3350 17 GRAM(S): 17 POWDER, FOR SOLUTION ORAL at 13:14

## 2020-03-06 RX ADMIN — Medication 40 MILLIGRAM(S): at 05:39

## 2020-03-06 RX ADMIN — ESCITALOPRAM OXALATE 10 MILLIGRAM(S): 10 TABLET, FILM COATED ORAL at 13:14

## 2020-03-06 RX ADMIN — OXYCODONE HYDROCHLORIDE 10 MILLIGRAM(S): 5 TABLET ORAL at 07:20

## 2020-03-06 RX ADMIN — CARVEDILOL PHOSPHATE 12.5 MILLIGRAM(S): 80 CAPSULE, EXTENDED RELEASE ORAL at 17:54

## 2020-03-06 RX ADMIN — GABAPENTIN 100 MILLIGRAM(S): 400 CAPSULE ORAL at 13:14

## 2020-03-06 RX ADMIN — Medication 650 MILLIGRAM(S): at 06:34

## 2020-03-06 NOTE — DIETITIAN INITIAL EVALUATION ADULT. - PROBLEM/PLAN-6
8/30/2018 4:43 PM 
 
Ms. Henny Ag 
[address] [place] [id number] Your A1c has improved! DECREASE Lantus to 15U daily. Continue remainder of your medications. For now check your glucose at home if your glucose is <90 or >300 call me or seek immediate medical attention if you faint, feel dizzy, confused, have chest pain. How can you care for yourself at home? Learn to recognize the early signs of low blood sugar. Signs include:  
Nausea. Hunger. Feeling nervous, irritable, or shaky. Cold, clammy, wet skin. Sweating (when you are not exercising). A fast heartbeat. Numbness or tingling of the fingertips or lips. If you feel an episode of low blood sugar coming on, drink fruit juice or sugared (not diet) soda, or eat sugar in the form of candy, cubes, or tablets. HOSTEX are another [employer]. Eat small, frequent meals so that you do not get too hungry between meals. Balance extra exercise with eating more. Keep a written record of your low blood sugar episodes, including when you last ate and what you ate, so that you can learn what causes your blood sugar to drop. Make sure your family, friends, and coworkers know the symptoms of low blood sugar and know what to do to get your sugar level up. Wear medical alert jewelry that lists your condition. You can buy this at most drugstores. When should you call for help? Call 911 anytime you think you may need emergency care. For example, call if:  
You have a seizure. You passed out (lost consciousness), or you suddenly become very sleepy or confused. (You may have very low blood sugar.) Call your doctor now or seek immediate medical care if:  
You have symptoms of low blood sugar, such as:  
Sweating. Feeling nervous, shaky, and weak. Extreme hunger and slight nausea. Dizziness and headache. Blurred vision. Resulted Orders METABOLIC PANEL, COMPREHENSIVE Result Value Ref Range Glucose 114 (H) 65 - 99 mg/dL BUN 11 8 - 27 mg/dL Creatinine 0.61 0.57 - 1.00 mg/dL GFR est non-AA 98 >59 mL/min/1.73 GFR est  >59 mL/min/1.73  
 BUN/Creatinine ratio 18 12 - 28 Sodium 139 134 - 144 mmol/L Potassium 4.2 3.5 - 5.2 mmol/L Chloride 101 96 - 106 mmol/L  
 CO2 25 20 - 29 mmol/L Calcium 9.9 8.7 - 10.3 mg/dL Protein, total 6.9 6.0 - 8.5 g/dL Albumin 4.2 3.6 - 4.8 g/dL GLOBULIN, TOTAL 2.7 1.5 - 4.5 g/dL A-G Ratio 1.6 1.2 - 2.2 Bilirubin, total 1.0 0.0 - 1.2 mg/dL Alk. phosphatase 54 39 - 117 IU/L  
 AST (SGOT) 16 0 - 40 IU/L  
 ALT (SGPT) 10 0 - 32 IU/L Narrative Performed at:  [address]  [id number] : Darletta Dancer MD, Phone:  [phone] LIPID PANEL Result Value Ref Range Cholesterol, total 174 100 - 199 mg/dL Triglyceride 199 (H) 0 - 149 mg/dL HDL Cholesterol 54 >39 mg/dL VLDL, calculated 40 5 - 40 mg/dL LDL, calculated 80 0 - 99 mg/dL Narrative Performed at:  [address]  [id number] : Darletta Dancer MD, Phone:  [phone] HEMOGLOBIN A1C WITH EAG Result Value Ref Range Hemoglobin A1c 6.2 (H) 4.8 - 5.6 % Comment:  
            Prediabetes: 5.7 - 6.4 Diabetes: >6.4 Glycemic control for adults with diabetes: <7.0 Estimated average glucose 131 mg/dL Narrative Performed at:  [address]  [id number] : Darletta Dancer MD, Phone:  [phone] MICROALBUMIN, UR, RAND W/ MICROALB/CREAT RATIO Result Value Ref Range Creatinine, urine 121.8 Not Estab. mg/dL Microalbumin, urine 13.6 Not Estab. ug/mL Microalb/Creat ratio (ug/mg creat.) 11.2 0.0 - 30.0 mg/g creat Narrative Performed at:  [address]  [id number] : Tatianna Lake MD, Phone:  [phone] TSH 3RD GENERATION Result Value Ref Range TSH 1.940 0.450 - 4.500 uIU/mL Narrative Performed at:  [address]  [id number] : Tatianna Lake MD, Phone:  [phone] T4, FREE Result Value Ref Range T4, Free 1.03 0.82 - 1.77 ng/dL Narrative Performed at:  [address]  [id number] : Tatianna Lake MD, Phone:  [phone] VITAMIN B12 Result Value Ref Range Vitamin B12 366 232 - 1245 pg/mL Narrative Performed at:  [address]  [id number] : Tatianna Lake MD, Phone:  [phone] DISPLAY PLAN FREE TEXT

## 2020-03-06 NOTE — PROGRESS NOTE ADULT - ASSESSMENT
64M w HTN, CHF (hypertensive CM w unknown EF), R knee OA, current smoker (2 cig/d) here for elective RIGHT TKA w Dr. Ramirez 3/3 now POD3    #Post-operative state - pain managed. c/w bowel regimen, c/w IS, PPx on ASA BID, PT pending [facility]  #Hypertension - improved 130-140s. Continued on home regimen  #Hypertensive cardiomyopathy - unknown EF when searching in HIE and chart. Appears euvolemic at this time. Appears euvolemic at this time and not in acute HF exacerbation. c/w coreg, lasix 40  #Mitral Regurgitation - seen on outpt summary, chronic. Appears stable at this time  #Depression - on lexapro  #PreDM - a1C pre-op was 6.   #CKD2 - Chronic, stable. pre-op w/ GFR 62 for AA. Likely exacerbated by uncontrolled HTN  #Obesity - BMI 33.9. Outpt f/u  #Leukocytosis - resolved. Likely post-op reactive    Recommendations   - Continue current BP regimen on discharge   - From medical standpoint, patient is optimized for dispo to [facility] by primary team today

## 2020-03-06 NOTE — DIETITIAN INITIAL EVALUATION ADULT. - ENERGY NEEDS
Height: 6 feet 2 inches, Weight (Current): 257 pounds,  pounds +/-10%, %IBW %135, BMI 33  IBW used to calculate energy needs due to pt's current body weight exceeding 120% of IBW   adjusted for age, wt, s/p SX, fluids per team

## 2020-03-06 NOTE — PROGRESS NOTE ADULT - SUBJECTIVE AND OBJECTIVE BOX
INTERVAL HPI/OVERNIGHT EVENTS:  MARLEN O/N    SUBJECTIVE: Patient seen and examined at bedside.   Pt states pain in R leg is improved compared to previous. Denies any numbness, fever, cough, chest pain, dyspnea, nausea, abd pain, dysuria. Pt states he does not like the food and has been eating little. Would prefer food w 'more flavor.' Have counseled today and yesterday to have adequate intake to promote adequate wound healing.     ROS: 12 point ROS reviewed and otherwise negative    OBJECTIVE:    VITAL SIGNS:  ICU Vital Signs Last 24 Hrs  T(C): 37.4 (06 Mar 2020 13:12), Max: 37.4 (06 Mar 2020 13:12)  T(F): 99.3 (06 Mar 2020 13:12), Max: 99.3 (06 Mar 2020 13:12)  HR: 77 (06 Mar 2020 13:12) (62 - 84)  BP: 144/78 (06 Mar 2020 13:12) (114/55 - 148/73)  BP(mean): --  ABP: --  ABP(mean): --  RR: 15 (06 Mar 2020 13:12) (15 - 21)  SpO2: 95% (06 Mar 2020 13:12) (94% - 96%)        03-05 @ 07:01  -  03-06 @ 07:00  --------------------------------------------------------  IN: 1445 mL / OUT: 1370 mL / NET: 75 mL      CAPILLARY BLOOD GLUCOSE          PHYSICAL EXAM:  GEN: AA Male in NAD on RA  HEENT: NC/AT, MMM  NECK: Supple  CV: RRR, no murmurs, no BLE edema, SCDs in place  PULM: nml effort,CTAB  ABD: Soft, NABS, non-tender  NEURO: alert, conversant, moving all ext  3/5 in R hip and knee. Otherwise 5/5, sensory intact, EOMI  SKIN: cryocuff on R knee. dressing c/d/i, minimal swelling surrounding site  Psych: appropriate    MEDICATIONS:  MEDICATIONS  (STANDING):  aspirin enteric coated 81 milliGRAM(s) Oral two times a day  BUpivacaine liposome 1.3% Injectable (no eMAR) 20 milliLiter(s) Local Injection once  carvedilol 12.5 milliGRAM(s) Oral every 12 hours  chlorhexidine 2% Cloths 1 Application(s) Topical once  escitalopram 10 milliGRAM(s) Oral daily  furosemide    Tablet 40 milliGRAM(s) Oral daily  gabapentin 100 milliGRAM(s) Oral three times a day  hydrALAZINE 50 milliGRAM(s) Oral two times a day  isosorbide   dinitrate Tablet (ISORDIL) 20 milliGRAM(s) Oral two times a day  polyethylene glycol 3350 17 Gram(s) Oral daily  povidone iodine 5% Nasal Swab 1 Application(s) Both Nostrils once  sacubitril 49 mG/valsartan 51 mG 1 Tablet(s) Oral two times a day    MEDICATIONS  (PRN):  aluminum hydroxide/magnesium hydroxide/simethicone Suspension 30 milliLiter(s) Oral four times a day PRN Indigestion  bisacodyl Suppository 10 milliGRAM(s) Rectal daily PRN If no bowel movement by POD#2  morphine  - Injectable 2 milliGRAM(s) IV Push every 3 hours PRN breakthrough pain  ondansetron Injectable 4 milliGRAM(s) IV Push every 6 hours PRN Nausea and/or Vomiting  oxyCODONE    IR 5 milliGRAM(s) Oral every 4 hours PRN Moderate Pain (4 - 6)  oxyCODONE    IR 10 milliGRAM(s) Oral every 4 hours PRN Severe Pain (7 - 10)  senna 2 Tablet(s) Oral at bedtime PRN Constipation      ALLERGIES:  Allergies    No Known Allergies    Intolerances        LABS:                        11.9   8.62  )-----------( 201      ( 06 Mar 2020 06:23 )             36.6     03-06    135  |  101  |  18  ----------------------------<  107<H>  4.4   |  25  |  1.06    Ca    8.3<L>      06 Mar 2020 06:23            RADIOLOGY & ADDITIONAL TESTS: Reviewed.

## 2020-03-06 NOTE — DIETITIAN INITIAL EVALUATION ADULT. - ADD RECOMMEND
Monitor GI distress, diet tolerance, labs, weights.   MVI daily. Honor pt food preferences as able.   RD to remain available for additional nutrition interventions as needed.

## 2020-03-06 NOTE — PROGRESS NOTE ADULT - SUBJECTIVE AND OBJECTIVE BOX
S: Patient seen and examined. Doing well this AM. Pain reported but controlled. No acute events overnight.  HTN overnight, stable. Better controlled    O:   Vital Signs Last 24 Hrs  T(C): 37 (06 Mar 2020 05:28), Max: 37.3 (05 Mar 2020 08:31)  T(F): 98.6 (06 Mar 2020 05:28), Max: 99.2 (05 Mar 2020 08:31)  HR: 79 (06 Mar 2020 05:28) (62 - 83)  BP: 148/73 (06 Mar 2020 05:28) (114/55 - 148/73)  BP(mean): --  RR: 21 (06 Mar 2020 05:28) (16 - 21)  SpO2: 96% (06 Mar 2020 05:28) (94% - 96%)    Motor: 5/5 GS/TA/EHL/FHL BL   SILT to patients baseline BL  WWP DP PT BL  Dressing C/D/I

## 2020-03-06 NOTE — PROGRESS NOTE ADULT - SUBJECTIVE AND OBJECTIVE BOX
Ortho Note    Pt seen and examined. Comfortable without complaints, pain controlled  Denies CP, SOB, N/V, numbness/tingling     Vital Signs Last 24 Hrs  T(C): 36.7 (03-06-20 @ 09:07), Max: 37 (03-06-20 @ 05:28)  T(F): 98 (03-06-20 @ 09:07), Max: 98.6 (03-06-20 @ 05:28)  HR: 84 (03-06-20 @ 09:07) (79 - 84)  BP: 129/76 (03-06-20 @ 09:07) (129/76 - 148/73)  BP(mean): --  RR: 20 (03-06-20 @ 09:07) (20 - 21)  SpO2: 94% (03-06-20 @ 09:07) (94% - 96%)  AVSS    focused exam:  General: Pt Alert and oriented, NAD  DSG C/D/I  Pulses: +2DP, WWP feet  Sensation: SILT BLE  Motor: 5/5 EHL/FHL/TA/GS                          11.9   8.62  )-----------( 201      ( 06 Mar 2020 06:23 )             36.6   06 Mar 2020 06:23    135    |  101    |  18     ----------------------------<  107    4.4     |  25     |  1.06     Ca    8.3        06 Mar 2020 06:23        A/P: 65yMale POD#3 s/p right total knee replacement  - Stable  - Pain Control  - appreciate medicine recs  - DVT ppx: ASA bid  - PT, WBS: WBAT  - continue bowel regimen  - OOB for meals, I/S  - dispo: KAY, pending insurance auth    Ortho Pager [phone]

## 2020-03-06 NOTE — DISCHARGE NOTE NURSING/CASE MANAGEMENT/SOCIAL WORK - PATIENT PORTAL LINK FT
You can access the FollowMyHealth Patient Portal offered by [facility] by registering at the following website: [email or web address]. By joining [email or web address]’s FollowMyHealth portal, you will also be able to view your health information using other applications (apps) compatible with our system.

## 2020-03-06 NOTE — DIETITIAN INITIAL EVALUATION ADULT. - OTHER INFO
64yoM, Alcohol abuse, Anxiety and depression, AV block 1st degree, CHF, Hypertensive cardiomyopathy, Mitral regurgitation, Osteoarthritis right knee, Sinus tachycardia, +right knee pain x 4-5 years worsened over time without improvement. Pt admitted for and now s/p Right total knee arthroplasty 3/3. Per progress noted pt having HTN overnight. No pain per flow sheets. +1 Edema, right knee. No pressure ulcers, Sx site noted. +BM 3/3. 64yoM, Alcohol abuse, Anxiety and depression, AV block 1st degree, CHF, Hypertensive cardiomyopathy, Mitral regurgitation, Osteoarthritis right knee, Sinus tachycardia, +right knee pain x 4-5 years worsened over time without improvement. Pt admitted for and now s/p Right total knee arthroplasty 3/3. Per progress noted pt having HTN overnight. No pain per flow sheets. +1 Edema, right knee. No pressure ulcers, Sx site noted. +BM 3/3; denies GI distress at this time.  Pt reports eating "Kiswahili foods" PTA however exact recall not provided. Good PO intake PTA however reports decreased PO Intake at this time 2/2 not liking foods (currently on regular diet). Pt reports limited dinner intake 3/5, however consumed 100% of bagel + Banana at breakfast today; despite pt with decreased PO intake pt feels ok with not eating 2/2 wanting to lose wt. No issues chewing/swallowing at this time. NKFA.  pounds, no wt changes PTA.   Education: Attempted to discuss difference between intended and unintended wt loss - encouraged intake of lean proteins at meals. Reviewed menu alternatives with pt. Understanding stated, provide additional motivation as needed.   Please see below for nutritions recommendations. Recs made with team.

## 2020-03-06 NOTE — DIETITIAN INITIAL EVALUATION ADULT. - DIET TYPE
low sodium - monitor for %PO intake, should intake remains decreased consider use of oral nutrition supplements

## 2020-03-10 PROCEDURE — 86803 HEPATITIS C AB TEST: CPT

## 2020-03-10 PROCEDURE — 73560 X-RAY EXAM OF KNEE 1 OR 2: CPT

## 2020-03-10 PROCEDURE — 87641 MR-STAPH DNA AMP PROBE: CPT

## 2020-03-10 PROCEDURE — C1713: CPT

## 2020-03-10 PROCEDURE — C1776: CPT

## 2020-03-10 PROCEDURE — 36415 COLL VENOUS BLD VENIPUNCTURE: CPT

## 2020-03-10 PROCEDURE — 85027 COMPLETE CBC AUTOMATED: CPT

## 2020-03-10 PROCEDURE — 97530 THERAPEUTIC ACTIVITIES: CPT

## 2020-03-10 PROCEDURE — 97116 GAIT TRAINING THERAPY: CPT

## 2020-03-10 PROCEDURE — 80048 BASIC METABOLIC PNL TOTAL CA: CPT

## 2020-03-12 DIAGNOSIS — M17.11 UNILATERAL PRIMARY OSTEOARTHRITIS, RIGHT KNEE: ICD-10-CM

## 2020-03-12 DIAGNOSIS — R73.03 PREDIABETES: ICD-10-CM

## 2020-03-12 DIAGNOSIS — D72.829 ELEVATED WHITE BLOOD CELL COUNT, UNSPECIFIED: ICD-10-CM

## 2020-03-12 DIAGNOSIS — F41.9 ANXIETY DISORDER, UNSPECIFIED: ICD-10-CM

## 2020-03-12 DIAGNOSIS — I50.9 HEART FAILURE, UNSPECIFIED: ICD-10-CM

## 2020-03-12 DIAGNOSIS — F32.9 MAJOR DEPRESSIVE DISORDER, SINGLE EPISODE, UNSPECIFIED: ICD-10-CM

## 2020-03-12 DIAGNOSIS — I13.0 HYPERTENSIVE HEART AND CHRONIC KIDNEY DISEASE WITH HEART FAILURE AND STAGE 1 THROUGH STAGE 4 CHRONIC KIDNEY DISEASE, OR UNSPECIFIED CHRONIC KIDNEY DISEASE: ICD-10-CM

## 2020-03-12 DIAGNOSIS — E66.9 OBESITY, UNSPECIFIED: ICD-10-CM

## 2020-03-12 DIAGNOSIS — N18.2 CHRONIC KIDNEY DISEASE, STAGE 2 (MILD): ICD-10-CM

## 2020-03-16 ENCOUNTER — APPOINTMENT (OUTPATIENT)
Dept: ORTHOPEDIC SURGERY | Facility: CLINIC | Age: 65
End: 2020-03-16
Payer: COMMERCIAL

## 2020-03-16 PROCEDURE — 99024 POSTOP FOLLOW-UP VISIT: CPT

## 2020-03-23 NOTE — ASSESSMENT
[FreeTextEntry1] : Assessment\par S/P right TKR 03/03/20- 1st post operative visit.\par \par Plan\par #1 Staples removed, Steri-Strips applied, wound care structures given\par #2 Pain medications refilled\par #3 Begin physical therapy, prescription given\par #4 F/U in 4 weeks\par \par All medical record entries made by the PA/Scribe/Fellow are at my, Dr. Nick Ramirez's direction and personally dictated by me on 03/16/2020]. I have reviewed the chart and agree that the record accurately reflects my personal performance of the history, physical exam, assessment, and plan. I have also personally directed reviewed, and agreed with the chart.\par

## 2020-03-23 NOTE — PHYSICAL EXAM
[de-identified] : General: Not in acute distress, dressed appropriately, sitting on examination table\par Skin: Warm and dry, normal turgor, no rashes\par Neurological: AOx3, Cranial nerves grossly in tact\par Psych: Mood and affect appropriate\par \par Right Knee: Well healed anteriorly midline surgical incision closed with staples. No swelling edema erythema redness or drainage. tender anteriorly. Neutral alignment. ROM: 0-85 Stable. 5/5 Strength. DNVI. Ambulates without devices.\par \par  [de-identified] : No imaging today.\par

## 2020-03-23 NOTE — END OF VISIT
[FreeTextEntry3] : By signing my name below, I, Masha Zimmer, attest that this documentation has been prepared under the direction and in the presence of Bin Gutierrez PA-C.\par

## 2020-03-23 NOTE — HISTORY OF PRESENT ILLNESS
[de-identified] : Jarek is a 64 year old male s/p right TKR on 03/03/20 here for 1st post operative visit. Patient is complaining of pain in the right knee; He is requesting an increase in his pain medication. Overall, patient is doing great and offers no other significant complaints.  all other ROS negative except as per HPI

## 2020-04-14 ENCOUNTER — APPOINTMENT (OUTPATIENT)
Dept: ORTHOPEDIC SURGERY | Facility: CLINIC | Age: 65
End: 2020-04-14

## 2020-04-22 ENCOUNTER — RX RENEWAL (OUTPATIENT)
Age: 65
End: 2020-04-22

## 2020-06-10 DIAGNOSIS — Z96.651 AFTERCARE FOLLOWING JOINT REPLACEMENT SURGERY: ICD-10-CM

## 2020-06-10 DIAGNOSIS — Z47.1 AFTERCARE FOLLOWING JOINT REPLACEMENT SURGERY: ICD-10-CM

## 2020-08-09 ENCOUNTER — RESULT CHARGE (OUTPATIENT)
Age: 65
End: 2020-08-09

## 2020-08-10 ENCOUNTER — APPOINTMENT (OUTPATIENT)
Dept: HEART AND VASCULAR | Facility: CLINIC | Age: 65
End: 2020-08-10

## 2020-08-10 VITALS
HEART RATE: 82 BPM | DIASTOLIC BLOOD PRESSURE: 90 MMHG | HEIGHT: 73 IN | RESPIRATION RATE: 14 BRPM | BODY MASS INDEX: 34.19 KG/M2 | SYSTOLIC BLOOD PRESSURE: 150 MMHG | OXYGEN SATURATION: 97 % | TEMPERATURE: 97.8 F | WEIGHT: 258 LBS

## 2020-08-10 DIAGNOSIS — I11.0 HYPERTENSIVE HEART DISEASE WITH HEART FAILURE: ICD-10-CM

## 2020-09-15 RX ORDER — CELECOXIB 200 MG/1
200 CAPSULE ORAL
Qty: 60 | Refills: 0 | Status: ACTIVE | COMMUNITY
Start: 2020-02-19 | End: 1900-01-01

## 2020-09-15 RX ORDER — DICLOFENAC SODIUM 10 MG/G
1 GEL TOPICAL
Qty: 100 | Refills: 1 | Status: ACTIVE | COMMUNITY
Start: 2019-06-07 | End: 1900-01-01

## 2021-12-12 DIAGNOSIS — I50.40 UNSPECIFIED COMBINED SYSTOLIC (CONGESTIVE) AND DIASTOLIC (CONGESTIVE) HEART FAILURE: ICD-10-CM

## 2021-12-12 DIAGNOSIS — I34.0 NONRHEUMATIC MITRAL (VALVE) INSUFFICIENCY: ICD-10-CM

## 2021-12-16 ENCOUNTER — APPOINTMENT (OUTPATIENT)
Dept: HEART AND VASCULAR | Facility: CLINIC | Age: 66
End: 2021-12-16
Payer: MEDICARE

## 2021-12-16 VITALS
OXYGEN SATURATION: 100 % | TEMPERATURE: 98.1 F | DIASTOLIC BLOOD PRESSURE: 89 MMHG | HEART RATE: 69 BPM | HEIGHT: 73 IN | SYSTOLIC BLOOD PRESSURE: 132 MMHG | BODY MASS INDEX: 35.65 KG/M2 | WEIGHT: 268.99 LBS

## 2021-12-16 DIAGNOSIS — I87.2 VENOUS INSUFFICIENCY (CHRONIC) (PERIPHERAL): ICD-10-CM

## 2021-12-16 DIAGNOSIS — I42.9 CARDIOMYOPATHY, UNSPECIFIED: ICD-10-CM

## 2021-12-16 DIAGNOSIS — I44.4 LEFT ANTERIOR FASCICULAR BLOCK: ICD-10-CM

## 2021-12-16 PROCEDURE — 93306 TTE W/DOPPLER COMPLETE: CPT

## 2021-12-16 PROCEDURE — 36415 COLL VENOUS BLD VENIPUNCTURE: CPT

## 2021-12-16 PROCEDURE — 99214 OFFICE O/P EST MOD 30 MIN: CPT

## 2021-12-16 PROCEDURE — 93000 ELECTROCARDIOGRAM COMPLETE: CPT

## 2021-12-16 RX ORDER — ESCITALOPRAM OXALATE 20 MG/1
20 TABLET ORAL DAILY
Qty: 90 | Refills: 1 | Status: ACTIVE | COMMUNITY
Start: 2019-08-15 | End: 1900-01-01

## 2021-12-16 RX ORDER — SACUBITRIL AND VALSARTAN 49; 51 MG/1; MG/1
49-51 TABLET, FILM COATED ORAL
Qty: 180 | Refills: 1 | Status: ACTIVE | COMMUNITY
Start: 2019-07-12 | End: 1900-01-01

## 2021-12-16 RX ORDER — ISOSORBIDE DINITRATE 20 MG/1
20 TABLET ORAL
Qty: 180 | Refills: 1 | Status: ACTIVE | COMMUNITY
Start: 2019-07-26 | End: 1900-01-01

## 2021-12-16 RX ORDER — CARVEDILOL 12.5 MG/1
12.5 TABLET, FILM COATED ORAL TWICE DAILY
Qty: 180 | Refills: 1 | Status: ACTIVE | COMMUNITY
Start: 2019-06-07 | End: 1900-01-01

## 2021-12-16 RX ORDER — HYDRALAZINE HYDROCHLORIDE 50 MG/1
50 TABLET ORAL TWICE DAILY
Qty: 180 | Refills: 1 | Status: ACTIVE | COMMUNITY
Start: 2019-07-26 | End: 1900-01-01

## 2021-12-17 PROBLEM — I87.2 VENOUS INSUFFICIENCY: Status: ACTIVE | Noted: 2021-12-17

## 2021-12-17 NOTE — REVIEW OF SYSTEMS
[Weight Gain (___ Lbs)] : [unfilled] ~Ulb weight gain [Dyspnea on exertion] : dyspnea during exertion [Lower Ext Edema] : lower extremity edema [Joint Pain] : joint pain [Joint Stiffness] : joint stiffness [Negative] : Heme/Lymph

## 2021-12-17 NOTE — ASSESSMENT
[FreeTextEntry1] : BMI 35\par \par hypertensive heart disease with marked LVH \par \par chronic venous insufficiency \par \par Prediabetes

## 2021-12-17 NOTE — REASON FOR VISIT
[Symptom and Test Evaluation] : symptom and test evaluation [Arrhythmia/ECG Abnorrmalities] : arrhythmia/ECG abnormalities [Hypertension] : hypertension [FreeTextEntry1] : 66 year old male with  hypertensive cardiomyopathy and CHF  presents for follow up after long hiatus. \par Supposed to be here for preop before right knee surgery \par \par No hx of MI, stroke , syncope or thrombophilia

## 2021-12-17 NOTE — HISTORY OF PRESENT ILLNESS
[FreeTextEntry1] : Now retired, he refuses covid vaccination  and denies personal  hx of covid infection \par \par He does have [employer]  and is mostly sedentary \par \par Requests  refill of medications \par \par EKG  shows sinus bradycardia 55 bpm  with LAFB  with intra atrial conduction delay  without ectopy, ischemia or LVH \par \par \par s/p right total knee replacement on 2020 ,  he continues to have knee pain \par \par \par Marked swelling of both legs , he does not wear compression  stocking despite our recommendations

## 2021-12-17 NOTE — PHYSICAL EXAM
[Well Developed] : well developed [Well Nourished] : well nourished [No Acute Distress] : no acute distress [Normal Conjunctiva] : normal conjunctiva [No Xanthelasma] : no xanthelasma [Normal Venous Pressure] : normal venous pressure [No Carotid Bruit] : no carotid bruit [Normal S1, S2] : normal S1, S2 [No Murmur] : no murmur [No Rub] : no rub [No Gallop] : no gallop [Clear Lung Fields] : clear lung fields [Good Air Entry] : good air entry [No Respiratory Distress] : no respiratory distress  [Soft] : abdomen soft [Non Tender] : non-tender [No Masses/organomegaly] : no masses/organomegaly [Normal Bowel Sounds] : normal bowel sounds [Abnormal Gait] : abnormal gait [No Cyanosis] : no cyanosis [No Clubbing] : no clubbing [No Varicosities] : no varicosities [Edema ___] : edema [unfilled] [Venous stasis] : venous stasis [No Rash] : no rash [No Skin Lesions] : no skin lesions [Moves all extremities] : moves all extremities [No Focal Deficits] : no focal deficits [Normal Speech] : normal speech [Alert and Oriented] : alert and oriented [Normal memory] : normal memory [de-identified] : BMI 35 [de-identified] : anicteric  [de-identified] : marked abdominal distension

## 2021-12-17 NOTE — DISCUSSION/SUMMARY
[Essential Hypertension] : essential hypertension [Stable] : stable [Weight Loss] : weight loss [Low Sodium Diet] : low sodium diet [NSAIDs Avoidance] : non-steroidal anti-inflammatory drugs avoidance [With Me] : with me [___ Month(s)] : in [unfilled] month(s) [FreeTextEntry1] : Venipuncture with Covid Ab A1c, lipids, etc \par \par Strongly advised to get Covid vaccination  ASAP \par \par Results of echocardiogram reviewed\par \par Medications reconciled  and renewed\par \par Reinforced need for compression stockings \par \par Calorie/ carbohydrate reduction

## 2021-12-20 LAB
ALBUMIN SERPL ELPH-MCNC: 4.3 G/DL
ALP BLD-CCNC: 97 U/L
ALT SERPL-CCNC: 9 U/L
ANION GAP SERPL CALC-SCNC: 13 MMOL/L
APPEARANCE: CLEAR
AST SERPL-CCNC: 12 U/L
BASOPHILS # BLD AUTO: 0.02 K/UL
BASOPHILS NFR BLD AUTO: 0.4 %
BILIRUB SERPL-MCNC: 0.6 MG/DL
BILIRUBIN URINE: NEGATIVE
BLOOD URINE: NEGATIVE
BUN SERPL-MCNC: 16 MG/DL
CALCIUM SERPL-MCNC: 9.8 MG/DL
CHLORIDE SERPL-SCNC: 104 MMOL/L
CHOLEST SERPL-MCNC: 212 MG/DL
CO2 SERPL-SCNC: 26 MMOL/L
COLOR: YELLOW
COVID-19 NUCLEOCAPSID  GAM ANTIBODY INTERPRETATION: POSITIVE
COVID-19 SPIKE DOMAIN ANTIBODY INTERPRETATION: POSITIVE
CREAT SERPL-MCNC: 1.33 MG/DL
EOSINOPHIL # BLD AUTO: 0.07 K/UL
EOSINOPHIL NFR BLD AUTO: 1.3 %
ESTIMATED AVERAGE GLUCOSE: 131 MG/DL
GLUCOSE QUALITATIVE U: NEGATIVE
GLUCOSE SERPL-MCNC: 90 MG/DL
HBA1C MFR BLD HPLC: 6.2 %
HCT VFR BLD CALC: 52.3 %
HDLC SERPL-MCNC: 41 MG/DL
HGB BLD-MCNC: 16.5 G/DL
IMM GRANULOCYTES NFR BLD AUTO: 0.2 %
KETONES URINE: NEGATIVE
LDLC SERPL CALC-MCNC: 143 MG/DL
LEUKOCYTE ESTERASE URINE: NEGATIVE
LYMPHOCYTES # BLD AUTO: 1.45 K/UL
LYMPHOCYTES NFR BLD AUTO: 27.7 %
MAN DIFF?: NORMAL
MCHC RBC-ENTMCNC: 31.1 PG
MCHC RBC-ENTMCNC: 31.5 GM/DL
MCV RBC AUTO: 98.5 FL
MONOCYTES # BLD AUTO: 0.47 K/UL
MONOCYTES NFR BLD AUTO: 9 %
NEUTROPHILS # BLD AUTO: 3.21 K/UL
NEUTROPHILS NFR BLD AUTO: 61.4 %
NITRITE URINE: NEGATIVE
NONHDLC SERPL-MCNC: 171 MG/DL
NT-PROBNP SERPL-MCNC: 139 PG/ML
PH URINE: 5.5
PLATELET # BLD AUTO: 240 K/UL
POTASSIUM SERPL-SCNC: 4.7 MMOL/L
PROT SERPL-MCNC: 7 G/DL
PROTEIN URINE: NORMAL
RBC # BLD: 5.31 M/UL
RBC # FLD: 16.4 %
SARS-COV-2 AB SERPL IA-ACNC: 97.8 U/ML
SARS-COV-2 AB SERPL QL IA: 137 INDEX
SODIUM SERPL-SCNC: 143 MMOL/L
SPECIFIC GRAVITY URINE: 1.03
TRIGL SERPL-MCNC: 138 MG/DL
TSH SERPL-ACNC: 1.55 UIU/ML
UROBILINOGEN URINE: ABNORMAL
WBC # FLD AUTO: 5.23 K/UL

## 2021-12-20 RX ORDER — ATORVASTATIN CALCIUM 10 MG/1
10 TABLET, FILM COATED ORAL DAILY
Qty: 1 | Refills: 1 | Status: ACTIVE | COMMUNITY
Start: 2021-12-20 | End: 1900-01-01

## 2022-04-04 ENCOUNTER — APPOINTMENT (OUTPATIENT)
Dept: HEART AND VASCULAR | Facility: CLINIC | Age: 67
End: 2022-04-04
Payer: MEDICARE

## 2022-04-04 VITALS
TEMPERATURE: 98 F | OXYGEN SATURATION: 96 % | WEIGHT: 267 LBS | HEIGHT: 73 IN | SYSTOLIC BLOOD PRESSURE: 139 MMHG | DIASTOLIC BLOOD PRESSURE: 90 MMHG | HEART RATE: 75 BPM | BODY MASS INDEX: 35.39 KG/M2

## 2022-04-04 DIAGNOSIS — R94.31 ABNORMAL ELECTROCARDIOGRAM [ECG] [EKG]: ICD-10-CM

## 2022-04-04 DIAGNOSIS — I11.0 HYPERTENSIVE HEART DISEASE WITH HEART FAILURE: ICD-10-CM

## 2022-04-04 DIAGNOSIS — E66.9 OBESITY, UNSPECIFIED: ICD-10-CM

## 2022-04-04 DIAGNOSIS — I11.9 HYPERTENSIVE HEART DISEASE W/OUT HEART FAILURE: ICD-10-CM

## 2022-04-04 DIAGNOSIS — R06.00 DYSPNEA, UNSPECIFIED: ICD-10-CM

## 2022-04-04 DIAGNOSIS — G47.33 OBSTRUCTIVE SLEEP APNEA (ADULT) (PEDIATRIC): ICD-10-CM

## 2022-04-04 DIAGNOSIS — E78.5 HYPERLIPIDEMIA, UNSPECIFIED: ICD-10-CM

## 2022-04-04 DIAGNOSIS — I43 HYPERTENSIVE HEART DISEASE W/OUT HEART FAILURE: ICD-10-CM

## 2022-04-04 DIAGNOSIS — R73.03 PREDIABETES.: ICD-10-CM

## 2022-04-04 PROCEDURE — 93000 ELECTROCARDIOGRAM COMPLETE: CPT

## 2022-04-04 PROCEDURE — 36415 COLL VENOUS BLD VENIPUNCTURE: CPT

## 2022-04-04 PROCEDURE — 99214 OFFICE O/P EST MOD 30 MIN: CPT

## 2022-04-05 DIAGNOSIS — E53.8 DEFICIENCY OF OTHER SPECIFIED B GROUP VITAMINS: ICD-10-CM

## 2022-04-05 PROBLEM — I11.9 HYPERTENSIVE HEART DISEASE: Status: ACTIVE | Noted: 2021-12-17

## 2022-04-05 PROBLEM — R94.31 ABNORMAL ECG: Status: ACTIVE | Noted: 2021-12-17

## 2022-04-05 PROBLEM — R06.00 DYSPNEA: Status: ACTIVE | Noted: 2019-05-10

## 2022-04-05 PROBLEM — R73.03 PRE-DIABETES: Status: ACTIVE | Noted: 2021-12-17

## 2022-04-05 PROBLEM — E66.9 OBESITY: Status: ACTIVE | Noted: 2021-12-17

## 2022-04-05 LAB
ALBUMIN SERPL ELPH-MCNC: 4 G/DL
ALP BLD-CCNC: 104 U/L
ALT SERPL-CCNC: 6 U/L
ANION GAP SERPL CALC-SCNC: 13 MMOL/L
AST SERPL-CCNC: 8 U/L
BASOPHILS # BLD AUTO: 0.01 K/UL
BASOPHILS NFR BLD AUTO: 0.2 %
BILIRUB SERPL-MCNC: 0.7 MG/DL
BUN SERPL-MCNC: 15 MG/DL
CALCIUM SERPL-MCNC: 9.2 MG/DL
CHLORIDE SERPL-SCNC: 103 MMOL/L
CHOLEST SERPL-MCNC: 144 MG/DL
CO2 SERPL-SCNC: 27 MMOL/L
CREAT SERPL-MCNC: 1.31 MG/DL
EGFR: 60 ML/MIN/1.73M2
EOSINOPHIL # BLD AUTO: 0.06 K/UL
EOSINOPHIL NFR BLD AUTO: 1.3 %
FOLATE SERPL-MCNC: 3.5 NG/ML
GLUCOSE SERPL-MCNC: 103 MG/DL
HCT VFR BLD CALC: 49.9 %
HDLC SERPL-MCNC: 37 MG/DL
HGB BLD-MCNC: 16 G/DL
IMM GRANULOCYTES NFR BLD AUTO: 0.2 %
LDLC SERPL CALC-MCNC: 83 MG/DL
LYMPHOCYTES # BLD AUTO: 1.3 K/UL
LYMPHOCYTES NFR BLD AUTO: 28.8 %
MAN DIFF?: NORMAL
MCHC RBC-ENTMCNC: 30.8 PG
MCHC RBC-ENTMCNC: 32.1 GM/DL
MCV RBC AUTO: 96 FL
MONOCYTES # BLD AUTO: 0.44 K/UL
MONOCYTES NFR BLD AUTO: 9.7 %
NEUTROPHILS # BLD AUTO: 2.7 K/UL
NEUTROPHILS NFR BLD AUTO: 59.8 %
NONHDLC SERPL-MCNC: 107 MG/DL
NT-PROBNP SERPL-MCNC: 112 PG/ML
PLATELET # BLD AUTO: 250 K/UL
POTASSIUM SERPL-SCNC: 3.8 MMOL/L
PROT SERPL-MCNC: 6.9 G/DL
RBC # BLD: 5.2 M/UL
RBC # FLD: 14.9 %
SODIUM SERPL-SCNC: 143 MMOL/L
TRIGL SERPL-MCNC: 118 MG/DL
TSH SERPL-ACNC: 1.36 UIU/ML
VIT B12 SERPL-MCNC: 398 PG/ML
WBC # FLD AUTO: 4.52 K/UL

## 2022-04-05 RX ORDER — FOLIC ACID 1 MG/1
1 TABLET ORAL DAILY
Qty: 90 | Refills: 1 | Status: ACTIVE | COMMUNITY
Start: 2022-04-05 | End: 1900-01-01

## 2022-04-05 NOTE — REASON FOR VISIT
[Symptom and Test Evaluation] : symptom and test evaluation [Hyperlipidemia] : hyperlipidemia [Hypertension] : hypertension [FreeTextEntry1] : 66 year old male with  hypertensive cardiomyopathy  with diastolic  CHF  presents for follow up .  \par \par \par No hx of MI, stroke , syncope or thrombophilia

## 2022-04-05 NOTE — HISTORY OF PRESENT ILLNESS
[FreeTextEntry1] : Patient never received covid vaccination, denies personal hx of covid  but his blood tests in December 2021 revealed he had prior infection\par \par Reports compliance with all medication \par \par "I don’t go out"  \par \par Breathing is improved and he denies chest pain, recent falls , syncope \par \par Lower extremity markedly improved over past two years \par \par EKG today shows  NSR 91bpm with  LAFB  without ectopy, ischemia or LVH. \par \par \par Smokes a couple cigarettes daily \par

## 2022-04-05 NOTE — DISCUSSION/SUMMARY
[Essential Hypertension] : essential hypertension [Responding to Treatment] : responding to treatment [Exercise Regimen] : an exercise regimen [Weight Loss] : weight loss [Low Sodium Diet] : low sodium diet [With Me] : with me [___ Month(s)] : in [unfilled] month(s) [de-identified] : Decrease dose of hydralazine to 25mg po bid  [FreeTextEntry1] : Venipuncture performed in office today with  A1c, lipids, BNP, etc \par \par Stop smoking \par \par Decrease hydralazine dose to 25 mg po bid \par \par follow up in one month  for echocardiogram \par \par Unable to exercise,  may need pharmacologic stress to assess for obstructive  CAD\par \par \par \par \par

## 2022-04-05 NOTE — PHYSICAL EXAM
[Well Nourished] : well nourished [No Acute Distress] : no acute distress [Obese] : obese [Normal Conjunctiva] : normal conjunctiva [No Xanthelasma] : no xanthelasma [Normal Venous Pressure] : normal venous pressure [No Carotid Bruit] : no carotid bruit [Normal S1, S2] : normal S1, S2 [No Murmur] : no murmur [No Rub] : no rub [No Gallop] : no gallop [Clear Lung Fields] : clear lung fields [Good Air Entry] : good air entry [No Respiratory Distress] : no respiratory distress  [Soft] : abdomen soft [Non Tender] : non-tender [No Masses/organomegaly] : no masses/organomegaly [Normal Bowel Sounds] : normal bowel sounds [Abnormal Gait] : abnormal gait [No Cyanosis] : no cyanosis [No Clubbing] : no clubbing [No Varicosities] : no varicosities [Edema ___] : edema [unfilled] [Venous stasis] : venous stasis [No Rash] : no rash [No Skin Lesions] : no skin lesions [Moves all extremities] : moves all extremities [No Focal Deficits] : no focal deficits [Normal Speech] : normal speech [Alert and Oriented] : alert and oriented [Normal memory] : normal memory [de-identified] : BMI 35    obese abdomen  [de-identified] : anicteric  [de-identified] : marked abdominal distension

## 2022-04-05 NOTE — ASSESSMENT
[FreeTextEntry1] : Well controlled HTN\par \par BMI 35  secondary to inactivity \par \par past hx of covid ,  patient unaware \par \par

## 2022-04-05 NOTE — REVIEW OF SYSTEMS
[Weight Gain (___ Lbs)] : no recent weight gain [Weight Loss (___ Lbs)] : [unfilled] ~Ulb weight loss [Dyspnea on exertion] : dyspnea during exertion [Lower Ext Edema] : lower extremity edema [Joint Pain] : joint pain [Joint Stiffness] : joint stiffness [Negative] : Heme/Lymph [FreeTextEntry9] : chronic low back pain

## 2022-05-28 RX ORDER — FUROSEMIDE 40 MG/1
40 TABLET ORAL
Qty: 90 | Refills: 1 | Status: ACTIVE | COMMUNITY
Start: 2019-05-10 | End: 1900-01-01
